# Patient Record
Sex: MALE | Race: WHITE | NOT HISPANIC OR LATINO | URBAN - METROPOLITAN AREA
[De-identification: names, ages, dates, MRNs, and addresses within clinical notes are randomized per-mention and may not be internally consistent; named-entity substitution may affect disease eponyms.]

---

## 2023-12-12 ENCOUNTER — LAB (OUTPATIENT)
Dept: FAMILY MEDICINE CLINIC | Facility: CLINIC | Age: 53
End: 2023-12-12
Payer: COMMERCIAL

## 2023-12-12 ENCOUNTER — OFFICE VISIT (OUTPATIENT)
Dept: FAMILY MEDICINE CLINIC | Facility: CLINIC | Age: 53
End: 2023-12-12
Payer: COMMERCIAL

## 2023-12-12 VITALS
BODY MASS INDEX: 28.58 KG/M2 | RESPIRATION RATE: 16 BRPM | DIASTOLIC BLOOD PRESSURE: 82 MMHG | SYSTOLIC BLOOD PRESSURE: 116 MMHG | HEART RATE: 74 BPM | WEIGHT: 211 LBS | HEIGHT: 72 IN | OXYGEN SATURATION: 97 %

## 2023-12-12 DIAGNOSIS — Z13.220 LIPID SCREENING: ICD-10-CM

## 2023-12-12 DIAGNOSIS — Z13.1 DIABETES MELLITUS SCREENING: ICD-10-CM

## 2023-12-12 DIAGNOSIS — L98.9 SKIN LESION: ICD-10-CM

## 2023-12-12 DIAGNOSIS — Z13.0 SCREENING FOR DEFICIENCY ANEMIA: ICD-10-CM

## 2023-12-12 DIAGNOSIS — Z12.5 PROSTATE CANCER SCREENING: ICD-10-CM

## 2023-12-12 DIAGNOSIS — Z00.00 PREVENTATIVE HEALTH CARE: Primary | ICD-10-CM

## 2023-12-12 DIAGNOSIS — Z12.11 ENCOUNTER FOR SCREENING FOR MALIGNANT NEOPLASM OF COLON: ICD-10-CM

## 2023-12-12 DIAGNOSIS — Z23 NEED FOR PROPHYLACTIC VACCINATION AGAINST DIPHTHERIA AND TETANUS: ICD-10-CM

## 2023-12-12 LAB
BASOPHILS # BLD AUTO: 0.06 10*3/MM3 (ref 0–0.2)
BASOPHILS NFR BLD AUTO: 0.9 % (ref 0–1.5)
CHOLEST SERPL-MCNC: 309 MG/DL (ref 0–200)
DEPRECATED RDW RBC AUTO: 41.4 FL (ref 37–54)
EOSINOPHIL # BLD AUTO: 0.1 10*3/MM3 (ref 0–0.4)
EOSINOPHIL NFR BLD AUTO: 1.5 % (ref 0.3–6.2)
ERYTHROCYTE [DISTWIDTH] IN BLOOD BY AUTOMATED COUNT: 12.2 % (ref 12.3–15.4)
HBA1C MFR BLD: 5.6 % (ref 4.8–5.6)
HCT VFR BLD AUTO: 42.3 % (ref 37.5–51)
HDLC SERPL-MCNC: 45 MG/DL (ref 40–60)
HGB BLD-MCNC: 14.7 G/DL (ref 13–17.7)
IMM GRANULOCYTES # BLD AUTO: 0.01 10*3/MM3 (ref 0–0.05)
IMM GRANULOCYTES NFR BLD AUTO: 0.2 % (ref 0–0.5)
LDLC SERPL CALC-MCNC: 215 MG/DL (ref 0–100)
LDLC/HDLC SERPL: 4.77 {RATIO}
LYMPHOCYTES # BLD AUTO: 2.01 10*3/MM3 (ref 0.7–3.1)
LYMPHOCYTES NFR BLD AUTO: 31 % (ref 19.6–45.3)
MCH RBC QN AUTO: 32.5 PG (ref 26.6–33)
MCHC RBC AUTO-ENTMCNC: 34.8 G/DL (ref 31.5–35.7)
MCV RBC AUTO: 93.6 FL (ref 79–97)
MONOCYTES # BLD AUTO: 0.64 10*3/MM3 (ref 0.1–0.9)
MONOCYTES NFR BLD AUTO: 9.9 % (ref 5–12)
NEUTROPHILS NFR BLD AUTO: 3.67 10*3/MM3 (ref 1.7–7)
NEUTROPHILS NFR BLD AUTO: 56.5 % (ref 42.7–76)
NRBC BLD AUTO-RTO: 0 /100 WBC (ref 0–0.2)
PLATELET # BLD AUTO: 270 10*3/MM3 (ref 140–450)
PMV BLD AUTO: 9.2 FL (ref 6–12)
PSA SERPL-MCNC: 0.89 NG/ML (ref 0–4)
RBC # BLD AUTO: 4.52 10*6/MM3 (ref 4.14–5.8)
TRIGL SERPL-MCNC: 247 MG/DL (ref 0–150)
VLDLC SERPL-MCNC: 49 MG/DL (ref 5–40)
WBC NRBC COR # BLD AUTO: 6.49 10*3/MM3 (ref 3.4–10.8)

## 2023-12-12 PROCEDURE — G0103 PSA SCREENING: HCPCS | Performed by: INTERNAL MEDICINE

## 2023-12-12 PROCEDURE — 83036 HEMOGLOBIN GLYCOSYLATED A1C: CPT | Performed by: INTERNAL MEDICINE

## 2023-12-12 PROCEDURE — 36415 COLL VENOUS BLD VENIPUNCTURE: CPT

## 2023-12-12 PROCEDURE — 85025 COMPLETE CBC W/AUTO DIFF WBC: CPT | Performed by: INTERNAL MEDICINE

## 2023-12-12 PROCEDURE — 80061 LIPID PANEL: CPT | Performed by: INTERNAL MEDICINE

## 2023-12-12 NOTE — PROGRESS NOTES
"Chief Complaint  Establish Care    HPI:    Logan Huitron presents to [facility]    Patient establishing with PCP as girlfriend wanted him to. Patient overall healthy and is not on medication(s) or diagnosed with medical conditions.     Blood pressure overall has been good on home readings. Not on medications.     He does have a couple spots on skin he would like to check out as father recently was seen by dermatology and had to have several spots removed. Denies previously seeing dermatology.     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    History reviewed. No pertinent past medical history.    No current outpatient medications on file.    Social History     Socioeconomic History    Marital status: Single   Tobacco Use    Smoking status: Former     Packs/day: 0.50     Years: 2.00     Additional pack years: 0.00     Total pack years: 1.00     Types: Cigarettes     Quit date: 1991     Years since quittin.1    Smokeless tobacco: Former     Types: Chew     Quit date:    Vaping Use    Vaping Use: Never used   Substance and Sexual Activity    Alcohol use: Yes     Alcohol/week: 6.0 standard drinks of alcohol     Types: 6 Cans of beer per week    Drug use: Never    Sexual activity: Defer        Objective   Vital Signs:  /82   Pulse 74   Resp 16   Ht 182.9 cm (72\")   Wt 95.7 kg (211 lb)   SpO2 97%   BMI 28.62 kg/m²   Estimated body mass index is 28.62 kg/m² as calculated from the following:    Height as of this encounter: 182.9 cm " "(72\").    Weight as of this encounter: 95.7 kg (211 lb).    Physical Exam:  General: Well-appearing patient, no apparent distress  HEENT: No posterior pharynx erythema, no tonsillar erythema or exudates  Neck: No cervical lymphadenopathy  Cardiac: Regular rate and rhythm, normal S1/S2, no murmur, rubs or gallops, no lower extremity edema  Lungs: Clear to auscultation bilaterally, no crackles or wheezes  Abdomen: Soft, non-tender, no guarding or rebound tenderness, no hepatosplenomegaly  Skin: No significant rashes or lesions  MSK: Grossly normal tone and strength  Neuro: Alert and oriented x3, CN II-XII grossly intact  Psych: Appropriate mood and affect    Assessment and Plan:    (Z00.00) Preventative health care  Patient is a 53 year old male who is overall doing well. Reviewed social and family history. Encouraged increased healthy diet and exercise and discussed importance to overall health.  Updating age and gender appropriate cancer screenings including PSA and colonoscopy. Discussed indicated vaccines based on age and comorbidities.  Referring patient to dermatology for routine skin checks.    Plan:  - Encourage healthy diet and exercise  - Up date vaccines, if necessary; Tdap, declines shingles  - Screening labs as ordered  - Update cancer screening as below  - Advanced health care directive     (L98.9) Skin lesion - Plan: Ambulatory Referral to Dermatology    (Z23) Need for prophylactic vaccination against diphtheria and tetanus - Plan: Tdap Vaccine Greater Than or Equal To 8yo IM    (Z13.220) Lipid screening - Plan: Lipid panel    (Z12.5) Prostate cancer screening - Plan: PSA SCREENING    (Z13.0) Screening for deficiency anemia - Plan: CBC w AUTO Differential    (Z13.1) Diabetes mellitus screening - Plan: Hemoglobin A1c    (Z12.11) Encounter for screening for malignant neoplasm of colon - Plan: Ambulatory Referral For Screening Colonoscopy     BMI is >= 25 and <30. (Overweight) The following options were " offered after discussion;: exercise counseling/recommendations and nutrition counseling/recommendations      Patient was given instructions and counseling regarding his condition or for health maintenance advice. Please see specific information pulled into the AVS if appropriate.       Dr Mikey Keller   Internal Medicine Physician  [facility]--[address], [address]  [place] [address]

## 2023-12-12 NOTE — PATIENT INSTRUCTIONS
Stay in the room for tetanus vaccine    Please stop at lab on second floor to have blood drawn    Not currently on medications    Follow up for colonoscopy as scheduled    Follow up with dermatology as scheduled    Encourage healthy diet and exercise    Follow up in one year or sooner if something arises

## 2024-02-14 ENCOUNTER — OFFICE VISIT (OUTPATIENT)
Dept: FAMILY MEDICINE CLINIC | Facility: CLINIC | Age: 54
End: 2024-02-14
Payer: COMMERCIAL

## 2024-02-14 VITALS
HEART RATE: 82 BPM | BODY MASS INDEX: 28.5 KG/M2 | SYSTOLIC BLOOD PRESSURE: 142 MMHG | OXYGEN SATURATION: 95 % | WEIGHT: 210.4 LBS | RESPIRATION RATE: 16 BRPM | DIASTOLIC BLOOD PRESSURE: 92 MMHG | HEIGHT: 72 IN

## 2024-02-14 DIAGNOSIS — H66.90 ACUTE OTITIS MEDIA, UNSPECIFIED OTITIS MEDIA TYPE: Primary | ICD-10-CM

## 2024-02-14 PROCEDURE — 99213 OFFICE O/P EST LOW 20 MIN: CPT | Performed by: INTERNAL MEDICINE

## 2024-02-14 RX ORDER — AMOXICILLIN AND CLAVULANATE POTASSIUM 875; 125 MG/1; MG/1
1 TABLET, FILM COATED ORAL 2 TIMES DAILY
Qty: 14 TABLET | Refills: 0 | Status: SHIPPED | OUTPATIENT
Start: 2024-02-14

## 2024-02-29 ENCOUNTER — ON CAMPUS - OUTPATIENT (OUTPATIENT)
Dept: URBAN - METROPOLITAN AREA HOSPITAL 2 | Facility: HOSPITAL | Age: 54
End: 2024-02-29
Payer: COMMERCIAL

## 2024-02-29 ENCOUNTER — OFFICE (OUTPATIENT)
Dept: URBAN - METROPOLITAN AREA PATHOLOGY 19 | Facility: PATHOLOGY | Age: 54
End: 2024-02-29
Payer: COMMERCIAL

## 2024-02-29 VITALS
RESPIRATION RATE: 17 BRPM | SYSTOLIC BLOOD PRESSURE: 122 MMHG | SYSTOLIC BLOOD PRESSURE: 126 MMHG | HEART RATE: 77 BPM | HEIGHT: 72 IN | DIASTOLIC BLOOD PRESSURE: 77 MMHG | DIASTOLIC BLOOD PRESSURE: 83 MMHG | DIASTOLIC BLOOD PRESSURE: 75 MMHG | DIASTOLIC BLOOD PRESSURE: 97 MMHG | HEART RATE: 72 BPM | HEART RATE: 80 BPM | SYSTOLIC BLOOD PRESSURE: 121 MMHG | SYSTOLIC BLOOD PRESSURE: 112 MMHG | DIASTOLIC BLOOD PRESSURE: 78 MMHG | DIASTOLIC BLOOD PRESSURE: 89 MMHG | OXYGEN SATURATION: 100 % | OXYGEN SATURATION: 98 % | HEART RATE: 74 BPM | SYSTOLIC BLOOD PRESSURE: 113 MMHG | HEART RATE: 70 BPM | HEART RATE: 81 BPM | SYSTOLIC BLOOD PRESSURE: 106 MMHG | TEMPERATURE: 98 F | DIASTOLIC BLOOD PRESSURE: 82 MMHG | WEIGHT: 208 LBS | SYSTOLIC BLOOD PRESSURE: 138 MMHG | DIASTOLIC BLOOD PRESSURE: 76 MMHG | HEART RATE: 82 BPM | SYSTOLIC BLOOD PRESSURE: 115 MMHG | RESPIRATION RATE: 18 BRPM | SYSTOLIC BLOOD PRESSURE: 116 MMHG | OXYGEN SATURATION: 99 % | DIASTOLIC BLOOD PRESSURE: 87 MMHG

## 2024-02-29 DIAGNOSIS — D12.4 BENIGN NEOPLASM OF DESCENDING COLON: ICD-10-CM

## 2024-02-29 DIAGNOSIS — Z12.11 ENCOUNTER FOR SCREENING FOR MALIGNANT NEOPLASM OF COLON: ICD-10-CM

## 2024-02-29 DIAGNOSIS — D12.2 BENIGN NEOPLASM OF ASCENDING COLON: ICD-10-CM

## 2024-02-29 DIAGNOSIS — K63.5 POLYP OF COLON: ICD-10-CM

## 2024-02-29 LAB
GI HISTOLOGY: A. ASCENDING COLON: (no result)
GI HISTOLOGY: B. DESCENDING COLON: (no result)
GI HISTOLOGY: C. SIGMOID COLON: (no result)
GI HISTOLOGY: PDF REPORT: (no result)

## 2024-02-29 PROCEDURE — 88305 TISSUE EXAM BY PATHOLOGIST: CPT | Performed by: PATHOLOGY

## 2024-02-29 PROCEDURE — 45385 COLONOSCOPY W/LESION REMOVAL: CPT | Mod: 33 | Performed by: INTERNAL MEDICINE

## 2024-02-29 RX ADMIN — ONDANSETRON HYDROCHLORIDE 4 MG: 4 SOLUTION ORAL at 09:30

## 2024-06-07 ENCOUNTER — HOSPITAL ENCOUNTER (INPATIENT)
Facility: HOSPITAL | Age: 54
LOS: 2 days | Discharge: HOME | DRG: 321 | End: 2024-06-09
Attending: EMERGENCY MEDICINE | Admitting: INTERNAL MEDICINE
Payer: COMMERCIAL

## 2024-06-07 ENCOUNTER — APPOINTMENT (OUTPATIENT)
Dept: GENERAL RADIOLOGY | Facility: HOSPITAL | Age: 54
DRG: 321 | End: 2024-06-07
Payer: COMMERCIAL

## 2024-06-07 DIAGNOSIS — I24.9 ACUTE CORONARY SYNDROME WITH HIGH TROPONIN: ICD-10-CM

## 2024-06-07 DIAGNOSIS — I21.09: Primary | ICD-10-CM

## 2024-06-07 DIAGNOSIS — K85.20 ALCOHOL-INDUCED ACUTE PANCREATITIS, UNSPECIFIED COMPLICATION STATUS: ICD-10-CM

## 2024-06-07 DIAGNOSIS — E78.00 HYPERCHOLESTEROLEMIA: ICD-10-CM

## 2024-06-07 DIAGNOSIS — I24.9 ACS (ACUTE CORONARY SYNDROME): ICD-10-CM

## 2024-06-07 DIAGNOSIS — F10.10 ALCOHOL ABUSE: ICD-10-CM

## 2024-06-07 LAB
ALBUMIN SERPL-MCNC: 4.2 G/DL (ref 3.5–5.2)
ALBUMIN/GLOB SERPL: 2.3 G/DL
ALP SERPL-CCNC: 101 U/L (ref 39–117)
ALT SERPL W P-5'-P-CCNC: 32 U/L (ref 1–41)
ANION GAP SERPL CALCULATED.3IONS-SCNC: 10.9 MMOL/L (ref 5–15)
APTT PPP: 26.5 SECONDS (ref 61–76.5)
APTT PPP: 34.8 SECONDS (ref 61–76.5)
AST SERPL-CCNC: 80 U/L (ref 1–40)
BASOPHILS # BLD AUTO: 0.04 10*3/MM3 (ref 0–0.2)
BASOPHILS # BLD AUTO: 0.04 10*3/MM3 (ref 0–0.2)
BASOPHILS NFR BLD AUTO: 0.4 % (ref 0–1.5)
BASOPHILS NFR BLD AUTO: 0.4 % (ref 0–1.5)
BILIRUB SERPL-MCNC: 0.2 MG/DL (ref 0–1.2)
BUN SERPL-MCNC: 19 MG/DL (ref 6–20)
BUN/CREAT SERPL: 18.6 (ref 7–25)
CALCIUM SPEC-SCNC: 9.5 MG/DL (ref 8.6–10.5)
CHLORIDE SERPL-SCNC: 101 MMOL/L (ref 98–107)
CHOLEST SERPL-MCNC: 235 MG/DL (ref 0–200)
CO2 SERPL-SCNC: 24.1 MMOL/L (ref 22–29)
CREAT SERPL-MCNC: 1.02 MG/DL (ref 0.76–1.27)
DEPRECATED RDW RBC AUTO: 44.3 FL (ref 37–54)
DEPRECATED RDW RBC AUTO: 44.4 FL (ref 37–54)
EGFRCR SERPLBLD CKD-EPI 2021: 87.3 ML/MIN/1.73
EOSINOPHIL # BLD AUTO: 0.04 10*3/MM3 (ref 0–0.4)
EOSINOPHIL # BLD AUTO: 0.09 10*3/MM3 (ref 0–0.4)
EOSINOPHIL NFR BLD AUTO: 0.4 % (ref 0.3–6.2)
EOSINOPHIL NFR BLD AUTO: 0.9 % (ref 0.3–6.2)
ERYTHROCYTE [DISTWIDTH] IN BLOOD BY AUTOMATED COUNT: 12.3 % (ref 12.3–15.4)
ERYTHROCYTE [DISTWIDTH] IN BLOOD BY AUTOMATED COUNT: 12.4 % (ref 12.3–15.4)
GEN 5 2HR TROPONIN T REFLEX: 2156 NG/L
GLOBULIN UR ELPH-MCNC: 1.8 GM/DL
GLUCOSE SERPL-MCNC: 96 MG/DL (ref 65–99)
HCT VFR BLD AUTO: 39.1 % (ref 37.5–51)
HCT VFR BLD AUTO: 42.9 % (ref 37.5–51)
HDLC SERPL-MCNC: 35 MG/DL (ref 40–60)
HGB BLD-MCNC: 13 G/DL (ref 13–17.7)
HGB BLD-MCNC: 14.1 G/DL (ref 13–17.7)
HOLD SPECIMEN: NORMAL
HOLD SPECIMEN: NORMAL
IMM GRANULOCYTES # BLD AUTO: 0.01 10*3/MM3 (ref 0–0.05)
IMM GRANULOCYTES # BLD AUTO: 0.03 10*3/MM3 (ref 0–0.05)
IMM GRANULOCYTES NFR BLD AUTO: 0.1 % (ref 0–0.5)
IMM GRANULOCYTES NFR BLD AUTO: 0.3 % (ref 0–0.5)
INR PPP: 0.96 (ref 0.93–1.1)
INR PPP: 0.97 (ref 0.93–1.1)
INR PPP: 1 (ref 0.8–1.2)
LDLC SERPL CALC-MCNC: 133 MG/DL (ref 0–100)
LDLC/HDLC SERPL: 3.61 {RATIO}
LIPASE SERPL-CCNC: 62 U/L (ref 13–60)
LYMPHOCYTES # BLD AUTO: 1.81 10*3/MM3 (ref 0.7–3.1)
LYMPHOCYTES # BLD AUTO: 1.97 10*3/MM3 (ref 0.7–3.1)
LYMPHOCYTES NFR BLD AUTO: 17.2 % (ref 19.6–45.3)
LYMPHOCYTES NFR BLD AUTO: 19.1 % (ref 19.6–45.3)
MAGNESIUM SERPL-MCNC: 2.1 MG/DL (ref 1.6–2.6)
MCH RBC QN AUTO: 32 PG (ref 26.6–33)
MCH RBC QN AUTO: 32.2 PG (ref 26.6–33)
MCHC RBC AUTO-ENTMCNC: 32.9 G/DL (ref 31.5–35.7)
MCHC RBC AUTO-ENTMCNC: 33.2 G/DL (ref 31.5–35.7)
MCV RBC AUTO: 96.8 FL (ref 79–97)
MCV RBC AUTO: 97.5 FL (ref 79–97)
MONOCYTES # BLD AUTO: 1 10*3/MM3 (ref 0.1–0.9)
MONOCYTES # BLD AUTO: 1.06 10*3/MM3 (ref 0.1–0.9)
MONOCYTES NFR BLD AUTO: 10.1 % (ref 5–12)
MONOCYTES NFR BLD AUTO: 9.7 % (ref 5–12)
NEUTROPHILS NFR BLD AUTO: 69.8 % (ref 42.7–76)
NEUTROPHILS NFR BLD AUTO: 7.18 10*3/MM3 (ref 1.7–7)
NEUTROPHILS NFR BLD AUTO: 7.52 10*3/MM3 (ref 1.7–7)
NEUTROPHILS NFR BLD AUTO: 71.6 % (ref 42.7–76)
NRBC BLD AUTO-RTO: 0 /100 WBC (ref 0–0.2)
NT-PROBNP SERPL-MCNC: 665.7 PG/ML (ref 0–900)
PLATELET # BLD AUTO: 186 10*3/MM3 (ref 140–450)
PLATELET # BLD AUTO: 275 10*3/MM3 (ref 140–450)
PMV BLD AUTO: 10.1 FL (ref 6–12)
PMV BLD AUTO: 9 FL (ref 6–12)
POTASSIUM SERPL-SCNC: 3.7 MMOL/L (ref 3.5–5.2)
PROT SERPL-MCNC: 6 G/DL (ref 6–8.5)
PROTHROMBIN TIME: 10.5 SECONDS (ref 9.6–11.7)
PROTHROMBIN TIME: 10.6 SECONDS (ref 9.6–11.7)
PROTHROMBIN TIME: 11 SECONDS
QT INTERVAL: 332 MS
QTC INTERVAL: 395 MS
RBC # BLD AUTO: 4.04 10*6/MM3 (ref 4.14–5.8)
RBC # BLD AUTO: 4.4 10*6/MM3 (ref 4.14–5.8)
SODIUM SERPL-SCNC: 136 MMOL/L (ref 136–145)
TRIGL SERPL-MCNC: 369 MG/DL (ref 0–150)
TROPONIN T DELTA: 1494 NG/L
TROPONIN T SERPL HS-MCNC: 2516 NG/L
TROPONIN T SERPL HS-MCNC: 662 NG/L
TSH SERPL DL<=0.05 MIU/L-ACNC: 3.23 UIU/ML (ref 0.27–4.2)
VLDLC SERPL-MCNC: 67 MG/DL (ref 5–40)
WBC NRBC COR # BLD AUTO: 10.29 10*3/MM3 (ref 3.4–10.8)
WBC NRBC COR # BLD AUTO: 10.5 10*3/MM3 (ref 3.4–10.8)
WHOLE BLOOD HOLD COAG: NORMAL
WHOLE BLOOD HOLD SPECIMEN: NORMAL

## 2024-06-07 PROCEDURE — 71046 X-RAY EXAM CHEST 2 VIEWS: CPT

## 2024-06-07 PROCEDURE — 84484 ASSAY OF TROPONIN QUANT: CPT

## 2024-06-07 PROCEDURE — 83735 ASSAY OF MAGNESIUM: CPT | Performed by: INTERNAL MEDICINE

## 2024-06-07 PROCEDURE — 80050 GENERAL HEALTH PANEL: CPT | Performed by: EMERGENCY MEDICINE

## 2024-06-07 PROCEDURE — 85730 THROMBOPLASTIN TIME PARTIAL: CPT | Performed by: EMERGENCY MEDICINE

## 2024-06-07 PROCEDURE — 25810000003 SODIUM CHLORIDE 0.9 % SOLUTION: Performed by: EMERGENCY MEDICINE

## 2024-06-07 PROCEDURE — 85610 PROTHROMBIN TIME: CPT | Performed by: INTERNAL MEDICINE

## 2024-06-07 PROCEDURE — 25810000003 SODIUM CHLORIDE 0.9 % SOLUTION: Performed by: STUDENT IN AN ORGANIZED HEALTH CARE EDUCATION/TRAINING PROGRAM

## 2024-06-07 PROCEDURE — 93005 ELECTROCARDIOGRAM TRACING: CPT | Performed by: EMERGENCY MEDICINE

## 2024-06-07 PROCEDURE — 84484 ASSAY OF TROPONIN QUANT: CPT | Performed by: STUDENT IN AN ORGANIZED HEALTH CARE EDUCATION/TRAINING PROGRAM

## 2024-06-07 PROCEDURE — 99291 CRITICAL CARE FIRST HOUR: CPT

## 2024-06-07 PROCEDURE — 80061 LIPID PANEL: CPT | Performed by: STUDENT IN AN ORGANIZED HEALTH CARE EDUCATION/TRAINING PROGRAM

## 2024-06-07 PROCEDURE — 83690 ASSAY OF LIPASE: CPT | Performed by: EMERGENCY MEDICINE

## 2024-06-07 PROCEDURE — 85025 COMPLETE CBC W/AUTO DIFF WBC: CPT | Performed by: INTERNAL MEDICINE

## 2024-06-07 PROCEDURE — 36415 COLL VENOUS BLD VENIPUNCTURE: CPT

## 2024-06-07 PROCEDURE — 25010000002 HEPARIN (PORCINE) 25000-0.45 UT/250ML-% SOLUTION: Performed by: EMERGENCY MEDICINE

## 2024-06-07 PROCEDURE — 93005 ELECTROCARDIOGRAM TRACING: CPT | Performed by: STUDENT IN AN ORGANIZED HEALTH CARE EDUCATION/TRAINING PROGRAM

## 2024-06-07 PROCEDURE — 85610 PROTHROMBIN TIME: CPT | Performed by: STUDENT IN AN ORGANIZED HEALTH CARE EDUCATION/TRAINING PROGRAM

## 2024-06-07 PROCEDURE — 85730 THROMBOPLASTIN TIME PARTIAL: CPT | Performed by: STUDENT IN AN ORGANIZED HEALTH CARE EDUCATION/TRAINING PROGRAM

## 2024-06-07 PROCEDURE — 83036 HEMOGLOBIN GLYCOSYLATED A1C: CPT | Performed by: STUDENT IN AN ORGANIZED HEALTH CARE EDUCATION/TRAINING PROGRAM

## 2024-06-07 PROCEDURE — 83880 ASSAY OF NATRIURETIC PEPTIDE: CPT | Performed by: STUDENT IN AN ORGANIZED HEALTH CARE EDUCATION/TRAINING PROGRAM

## 2024-06-07 RX ORDER — NITROGLYCERIN 0.4 MG/1
0.4 TABLET SUBLINGUAL
Status: DISCONTINUED | OUTPATIENT
Start: 2024-06-07 | End: 2024-06-08

## 2024-06-07 RX ORDER — ASPIRIN 325 MG
325 TABLET ORAL ONCE
Status: COMPLETED | OUTPATIENT
Start: 2024-06-07 | End: 2024-06-07

## 2024-06-07 RX ORDER — SODIUM CHLORIDE 9 MG/ML
75 INJECTION, SOLUTION INTRAVENOUS CONTINUOUS
Status: DISCONTINUED | OUTPATIENT
Start: 2024-06-07 | End: 2024-06-08

## 2024-06-07 RX ORDER — SODIUM CHLORIDE 9 MG/ML
40 INJECTION, SOLUTION INTRAVENOUS AS NEEDED
Status: DISCONTINUED | OUTPATIENT
Start: 2024-06-07 | End: 2024-06-08

## 2024-06-07 RX ORDER — HEPARIN SODIUM 10000 [USP'U]/100ML
10.8 INJECTION, SOLUTION INTRAVENOUS
Status: DISCONTINUED | OUTPATIENT
Start: 2024-06-07 | End: 2024-06-08

## 2024-06-07 RX ORDER — SODIUM CHLORIDE 9 MG/ML
40 INJECTION, SOLUTION INTRAVENOUS AS NEEDED
Status: DISCONTINUED | OUTPATIENT
Start: 2024-06-07 | End: 2024-06-09 | Stop reason: HOSPADM

## 2024-06-07 RX ORDER — ONDANSETRON 4 MG/1
4 TABLET, ORALLY DISINTEGRATING ORAL EVERY 6 HOURS PRN
Status: DISCONTINUED | OUTPATIENT
Start: 2024-06-07 | End: 2024-06-08

## 2024-06-07 RX ORDER — SODIUM CHLORIDE 0.9 % (FLUSH) 0.9 %
10 SYRINGE (ML) INJECTION AS NEEDED
Status: DISCONTINUED | OUTPATIENT
Start: 2024-06-07 | End: 2024-06-09 | Stop reason: HOSPADM

## 2024-06-07 RX ORDER — ONDANSETRON 2 MG/ML
4 INJECTION INTRAMUSCULAR; INTRAVENOUS EVERY 6 HOURS PRN
Status: DISCONTINUED | OUTPATIENT
Start: 2024-06-07 | End: 2024-06-08

## 2024-06-07 RX ORDER — SODIUM CHLORIDE 0.9 % (FLUSH) 0.9 %
10 SYRINGE (ML) INJECTION EVERY 12 HOURS SCHEDULED
Status: DISCONTINUED | OUTPATIENT
Start: 2024-06-07 | End: 2024-06-09 | Stop reason: HOSPADM

## 2024-06-07 RX ORDER — ASPIRIN 81 MG/1
81 TABLET ORAL DAILY
Status: DISCONTINUED | OUTPATIENT
Start: 2024-06-08 | End: 2024-06-09 | Stop reason: HOSPADM

## 2024-06-07 RX ORDER — SODIUM CHLORIDE 0.9 % (FLUSH) 0.9 %
3 SYRINGE (ML) INJECTION EVERY 12 HOURS SCHEDULED
Status: DISCONTINUED | OUTPATIENT
Start: 2024-06-07 | End: 2024-06-08

## 2024-06-07 RX ORDER — HEPARIN SODIUM 10000 [USP'U]/100ML
10.8 INJECTION, SOLUTION INTRAVENOUS
Status: DISCONTINUED | OUTPATIENT
Start: 2024-06-07 | End: 2024-06-07

## 2024-06-07 RX ORDER — HEPARIN SODIUM 1000 [USP'U]/ML
4000 INJECTION, SOLUTION INTRAVENOUS; SUBCUTANEOUS ONCE
Status: DISCONTINUED | OUTPATIENT
Start: 2024-06-07 | End: 2024-06-07

## 2024-06-07 RX ORDER — SODIUM CHLORIDE 0.9 % (FLUSH) 0.9 %
3-10 SYRINGE (ML) INJECTION AS NEEDED
Status: DISCONTINUED | OUTPATIENT
Start: 2024-06-07 | End: 2024-06-08

## 2024-06-07 RX ORDER — ATORVASTATIN CALCIUM 40 MG/1
80 TABLET, FILM COATED ORAL NIGHTLY
Status: DISCONTINUED | OUTPATIENT
Start: 2024-06-07 | End: 2024-06-09 | Stop reason: HOSPADM

## 2024-06-07 RX ADMIN — HEPARIN SODIUM 10.8 UNITS/KG/HR: 10000 INJECTION, SOLUTION INTRAVENOUS at 16:54

## 2024-06-07 RX ADMIN — ATORVASTATIN CALCIUM 80 MG: 40 TABLET, FILM COATED ORAL at 21:27

## 2024-06-07 RX ADMIN — SODIUM CHLORIDE 75 ML/HR: 900 INJECTION, SOLUTION INTRAVENOUS at 20:59

## 2024-06-07 RX ADMIN — NITROGLYCERIN 0.4 MG: 0.4 TABLET SUBLINGUAL at 16:14

## 2024-06-07 RX ADMIN — Medication 10 ML: at 21:27

## 2024-06-07 RX ADMIN — NITROGLYCERIN 0.4 MG: 0.4 TABLET SUBLINGUAL at 16:04

## 2024-06-07 RX ADMIN — ASPIRIN 325 MG ORAL TABLET 325 MG: 325 PILL ORAL at 15:57

## 2024-06-07 RX ADMIN — SODIUM CHLORIDE 1000 ML: 9 INJECTION, SOLUTION INTRAVENOUS at 16:34

## 2024-06-07 RX ADMIN — Medication 10 ML: at 20:59

## 2024-06-07 NOTE — ED NOTES
Report given to Mariya with EMS.  Did explain that MD's are aware of patients troponin, hep gtt infusing.  Pt left with belongings and paperwork, EMTALA as well.

## 2024-06-07 NOTE — Clinical Note
A 5 fr sheath was successfully inserted into the right femoral artery.
ACT = 250 (sec). ACT was drawn at 10:42 EDT. ACT result was completed at 10:48 EDT.
ACT = 256 (sec). ACT was drawn at 10:30 EDT. ACT result was completed at 10:35 EDT.
All interventional equipment removed.
Allergies reviewed.  H&P note has been confirmed for the patient. Procedural consent has been signed.  Staff has reviewed the patient's labs.
Balloon inserted in left anterior descending.
Balloon inserted in left anterior descending.
Catheter Pulled back from LV to AO. Measurement captured.
Catheter inserted with wire simultaneously.
Dr. James reviewing films 
Dr. Solorzano arrived for intervention 
First balloon inflation max pressure = 12 crystal. First balloon inflation duration = 6 seconds. Second inflation of balloon - Max pressure = 12 crystal. 2nd Inflation of balloon - Duration = 5 seconds.
First balloon inflation max pressure = 12 crystal. First balloon inflation duration = 6 seconds. Second inflation of balloon - Max pressure = 12 crystal. 2nd Inflation of balloon - Duration = 6 seconds.
First balloon inflation max pressure = 14 crystal. First balloon inflation duration = 4 seconds.
Groin soft no hematoma.  Pt sitting at 30 degrees.
Hemostasis started on the right femoral artery. Angio-Seal was used in achieving hemostasis. Closure device deployed in the vessel. Hemostasis achieved successfully.
IVUS Procedure End
IVUS catheter advanced 
IVUS catheter removed, images being reviewed 
Interventional Guidewire removed without incident
Intravascular ultrasound catheter inserted for high resolution imaging
Left anterior descending lesion.
Left anterior descending stent inserted.
No in lab complications
Patient to be held and monitored in cath lab while room is being cleaned 
Patient was given Post Procedure instruction by the staff.
Physician notified by staff.
Prepped: right groin. Prepped with: ChloraPrep. The site was clipped. The patient was draped in a sterile fashion.
Removed intact
Removed intact
Replaced previous sheath in the right femoral artery.
Report was  verbally given at bedside. .
Right femoral groin site clean, dry and intact.  Right pulses are Pt 2+ and DP doppler.  Leg is warm.  Pt resting comfortably.
Right groin soft no hematoma. Pulses 2+
Rt. Femoral angiogram 
Stent balloon removed intact.
The left DP pulse is +2. The right DP pulse is detected w/ doppler. The PT pulses are +2 bilaterally.
The left coronary artery was selectively engaged, injected and visualized.
The left ventricle was injected and visualized. Rate = 12 mL/sec. Total volume = 36 mL.
The physician has confirmed that the patient has been reassessed and is appropriate for moderate sedation
The right coronary artery was selectively engaged, injected and visualized.
Unchanged
Wire inserted in left anterior descending.
catheter inserted over wire.
catheter removed.
guide catheter removed .
stated

## 2024-06-07 NOTE — FSED PROVIDER NOTE
Subjective   History of Present Illness  53-year-old male presents to the emergency department with chief complaint of chest pain.  Patient states his current dull chest pain for the past 4 hours.  Constant.  No radiation.  4 out of 10.  Patient was playing golf and states he noticed worsening of the pain on his back swing.  First episode of the pain was 2 days ago when it lasted for 30 minutes.  He had several episodes that day.  Today's episode is constant without any relief of the pain.  He denies any shortness of breath, nausea, vomiting, diaphoresis, radiation of the pain.  Patient has a history of hypercholesterolemia which is untreated.  Patient also has history of daily drinking up to a sixpack of beer        Review of Systems   Constitutional: Negative.    HENT: Negative.     Respiratory: Negative.     Cardiovascular:  Positive for chest pain.   Gastrointestinal: Negative.    All other systems reviewed and are negative.      History reviewed. No pertinent past medical history.    No Known Allergies    Past Surgical History:   Procedure Laterality Date    HAND SURGERY      TONSILLECTOMY         Family History   Problem Relation Age of Onset    No Known Problems Mother     No Known Problems Father        Social History     Socioeconomic History    Marital status: Single   Tobacco Use    Smoking status: Former     Current packs/day: 0.00     Average packs/day: 0.5 packs/day for 2.0 years (1.0 ttl pk-yrs)     Types: Cigarettes     Start date: 1989     Quit date: 1991     Years since quittin.6    Smokeless tobacco: Former     Types: Chew     Quit date:    Vaping Use    Vaping status: Never Used   Substance and Sexual Activity    Alcohol use: Yes     Alcohol/week: 6.0 standard drinks of alcohol     Types: 6 Cans of beer per week    Drug use: Never    Sexual activity: Defer           Objective   Physical Exam  Vitals and nursing note reviewed.   Constitutional:       Appearance: He is  well-developed.   HENT:      Head: Normocephalic and atraumatic.   Eyes:      Extraocular Movements: Extraocular movements intact.      Pupils: Pupils are equal, round, and reactive to light.   Cardiovascular:      Heart sounds: Normal heart sounds.   Pulmonary:      Effort: Pulmonary effort is normal.      Breath sounds: Normal breath sounds.   Chest:      Chest wall: No mass, tenderness or edema.   Abdominal:      General: Bowel sounds are normal.      Palpations: Abdomen is soft.   Musculoskeletal:         General: Normal range of motion.      Cervical back: Normal range of motion and neck supple.   Skin:     General: Skin is warm and dry.   Neurological:      General: No focal deficit present.      Mental Status: He is alert.   Psychiatric:         Mood and Affect: Mood normal.         Behavior: Behavior normal.         ECG 12 Lead      Date/Time: 6/7/2024 4:02 PM    Performed by: Juan Chavarria MD  Authorized by: Juan Chavarria MD  Previous ECG: no previous ECG available  Rhythm: sinus rhythm  Rate: normal  QRS axis: normal  ST Segments: ST segments normal  T Waves: T waves normal  Q waves: V1, V2 and V3  Clinical impression: abnormal ECG    ECG 12 Lead      Date/Time: 6/7/2024 4:34 PM    Performed by: Juan Chavarria MD  Authorized by: Juan Chavarria MD  Rhythm: sinus rhythm  Rate: bradycardic  Clinical impression: abnormal ECG    Critical Care    Performed by: Juan Chavarria MD  Authorized by: Juan Chavarria MD    Critical care provider statement:     Critical care time (minutes):  70    Critical care time was exclusive of:  Separately billable procedures and treating other patients    Critical care was necessary to treat or prevent imminent or life-threatening deterioration of the following conditions:  Circulatory failure    Critical care was time spent personally by me on the following activities:  Ordering and review of laboratory studies, ordering and review of radiographic  studies, pulse oximetry, re-evaluation of patient's condition, obtaining history from patient or surrogate, discussions with primary provider and discussions with consultants    I assumed direction of critical care for this patient from another provider in my specialty: yes      Care discussed with: admitting provider and accepting provider at another facility               ED Course  ED Course as of 06/07/24 1657   Fri Jun 07, 2024   1638 Lipase(!): 62 [RW]      ED Course User Index  [RW] Juan Chavarria MD      No change in pain with initial nitro                                     Medical Decision Making  Amount and/or Complexity of Data Reviewed  Independent Historian: spouse  Labs: ordered.  Radiology: ordered.  ECG/medicine tests: ordered and independent interpretation performed. Decision-making details documented in ED Course.  Discussion of management or test interpretation with external provider(s): Internal medicine and cardiology    Risk  OTC drugs.  Prescription drug management.  Decision regarding hospitalization.        Final diagnoses:   Acute transmural myocardial infarct anterior wall, initial hospitaliz   Acute coronary syndrome with high troponin   Alcohol-induced acute pancreatitis, unspecified complication status   Alcohol abuse   Hypercholesterolemia       ED Disposition  ED Disposition       ED Disposition   Decision to Admit    Condition   --    Comment   Level of Care: Progressive Care [20]   Diagnosis: ACS (acute coronary syndrome) [470645]   Admitting Physician: EDY ALDRIDGE [[id number]]   Attending Physician: EDY ALDRIDGE [[id number]]   Certification: I Certify That Inpatient Hospital Services Are Medically Necessary For Greater Than 2 Midnights                 No follow-up provider specified.       Medication List      No changes were made to your prescriptions during this visit.

## 2024-06-07 NOTE — ED NOTES
Pt called out on call light and stated he was feeling lightheaded. BP and HR dropped. MD informed. Bolus NS started. O2 2L placed and EKG done.

## 2024-06-07 NOTE — ED NOTES
Patient resting in room, States he is currently in no pain and actually feels better.  Pt second troponin higher than the first.  Per day shift RN, pt to remain on heparin gtt and will be seen at hospital, providers are aware. Pts family at bedside.  Waiting on transport.  Verified heparin dose to order.  Will continue to monitor.

## 2024-06-07 NOTE — ED NOTES
Pt complains of midsternal chest pain since Wednesday. Came in today because it wasn't getting any better. Denies any radiating pain or shortness of breath.

## 2024-06-07 NOTE — ED NOTES
Spoke with Dr. James regarding increase in Toponin. Dr. James states that he will be seen when transferred to [facility] and that noting about the plan of care will change at this time.

## 2024-06-08 ENCOUNTER — APPOINTMENT (OUTPATIENT)
Dept: CARDIOLOGY | Facility: HOSPITAL | Age: 54
DRG: 321 | End: 2024-06-08
Payer: COMMERCIAL

## 2024-06-08 PROBLEM — I21.09: Status: ACTIVE | Noted: 2024-06-07

## 2024-06-08 PROBLEM — K85.20 ALCOHOL-INDUCED ACUTE PANCREATITIS: Status: ACTIVE | Noted: 2024-06-07

## 2024-06-08 PROBLEM — I24.9 ACUTE CORONARY SYNDROME WITH HIGH TROPONIN: Status: ACTIVE | Noted: 2024-06-07

## 2024-06-08 PROBLEM — E78.00 HYPERCHOLESTEROLEMIA: Status: ACTIVE | Noted: 2024-06-07

## 2024-06-08 PROBLEM — F10.10 ALCOHOL ABUSE: Status: ACTIVE | Noted: 2024-06-07

## 2024-06-08 LAB
ACT BLD: 250 SECONDS (ref 89–137)
ACT BLD: 256 SECONDS (ref 89–137)
ANION GAP SERPL CALCULATED.3IONS-SCNC: 8.9 MMOL/L (ref 5–15)
AORTIC DIMENSIONLESS INDEX: 0.97 (DI)
APTT PPP: 94.2 SECONDS (ref 61–76.5)
BH CV ECHO LEFT VENTRICLE GLOBAL LONGITUDINAL STRAIN: -9.4 %
BH CV ECHO MEAS - ACS: 2 CM
BH CV ECHO MEAS - AO MAX PG: 3.5 MMHG
BH CV ECHO MEAS - AO MEAN PG: 2 MMHG
BH CV ECHO MEAS - AO V2 MAX: 93.8 CM/SEC
BH CV ECHO MEAS - AO V2 VTI: 18.6 CM
BH CV ECHO MEAS - AVA(I,D): 2.39 CM2
BH CV ECHO MEAS - EDV(CUBED): 125 ML
BH CV ECHO MEAS - EDV(MOD-SP4): 70.5 ML
BH CV ECHO MEAS - EF(MOD-SP4): 41.8 %
BH CV ECHO MEAS - ESV(CUBED): 39.3 ML
BH CV ECHO MEAS - ESV(MOD-SP4): 41 ML
BH CV ECHO MEAS - FS: 32 %
BH CV ECHO MEAS - IVS/LVPW: 1.09 CM
BH CV ECHO MEAS - IVSD: 1.2 CM
BH CV ECHO MEAS - LA DIMENSION: 3.1 CM
BH CV ECHO MEAS - LAT PEAK E' VEL: 7.9 CM/SEC
BH CV ECHO MEAS - LV MASS(C)D: 220.3 GRAMS
BH CV ECHO MEAS - LV MAX PG: 3.3 MMHG
BH CV ECHO MEAS - LV MEAN PG: 2 MMHG
BH CV ECHO MEAS - LV V1 MAX: 91 CM/SEC
BH CV ECHO MEAS - LV V1 VTI: 17.5 CM
BH CV ECHO MEAS - LVIDD: 5 CM
BH CV ECHO MEAS - LVIDS: 3.4 CM
BH CV ECHO MEAS - LVOT AREA: 2.5 CM2
BH CV ECHO MEAS - LVOT DIAM: 1.8 CM
BH CV ECHO MEAS - LVPWD: 1.1 CM
BH CV ECHO MEAS - MED PEAK E' VEL: 7.2 CM/SEC
BH CV ECHO MEAS - MR MAX PG: 23.8 MMHG
BH CV ECHO MEAS - MR MAX VEL: 244 CM/SEC
BH CV ECHO MEAS - MV A MAX VEL: 92.2 CM/SEC
BH CV ECHO MEAS - MV DEC SLOPE: 514.5 CM/SEC2
BH CV ECHO MEAS - MV DEC TIME: 0.16 SEC
BH CV ECHO MEAS - MV E MAX VEL: 103 CM/SEC
BH CV ECHO MEAS - MV E/A: 1.12
BH CV ECHO MEAS - MV MAX PG: 3 MMHG
BH CV ECHO MEAS - MV MEAN PG: 2 MMHG
BH CV ECHO MEAS - MV P1/2T: 56.2 MSEC
BH CV ECHO MEAS - MV V2 VTI: 21.3 CM
BH CV ECHO MEAS - MVA(P1/2T): 3.9 CM2
BH CV ECHO MEAS - MVA(VTI): 2.09 CM2
BH CV ECHO MEAS - PA V2 MAX: 74.4 CM/SEC
BH CV ECHO MEAS - QP/QS: 0.74
BH CV ECHO MEAS - RV MAX PG: 1.25 MMHG
BH CV ECHO MEAS - RV V1 MAX: 55.9 CM/SEC
BH CV ECHO MEAS - RV V1 VTI: 10.5 CM
BH CV ECHO MEAS - RVDD: 2.4 CM
BH CV ECHO MEAS - RVOT DIAM: 2 CM
BH CV ECHO MEAS - SV(LVOT): 44.5 ML
BH CV ECHO MEAS - SV(MOD-SP4): 29.5 ML
BH CV ECHO MEAS - SV(RVOT): 33 ML
BH CV ECHO MEAS - TAPSE (>1.6): 2.7 CM
BH CV ECHO MEAS - TR MAX PG: 19.4 MMHG
BH CV ECHO MEAS - TR MAX VEL: 220 CM/SEC
BH CV ECHO MEAS RV FREE WALL STRAIN: -11.6 %
BH CV ECHO MEASUREMENTS AVERAGE E/E' RATIO: 13.64
BH CV XLRA - TDI S': 12.3 CM/SEC
BUN SERPL-MCNC: 12 MG/DL (ref 6–20)
BUN/CREAT SERPL: 14 (ref 7–25)
CALCIUM SPEC-SCNC: 8.7 MG/DL (ref 8.6–10.5)
CHLORIDE SERPL-SCNC: 105 MMOL/L (ref 98–107)
CO2 SERPL-SCNC: 23.1 MMOL/L (ref 22–29)
CREAT SERPL-MCNC: 0.86 MG/DL (ref 0.76–1.27)
EGFRCR SERPLBLD CKD-EPI 2021: 102.9 ML/MIN/1.73
GLUCOSE SERPL-MCNC: 113 MG/DL (ref 65–99)
HBA1C MFR BLD: 5.42 % (ref 4.8–5.6)
POTASSIUM SERPL-SCNC: 3.9 MMOL/L (ref 3.5–5.2)
QT INTERVAL: 348 MS
QTC INTERVAL: 411 MS
SINUS: 2.8 CM
SODIUM SERPL-SCNC: 137 MMOL/L (ref 136–145)
STJ: 2.3 CM

## 2024-06-08 PROCEDURE — 25010000002 NITROGLYCERIN 5 MG/ML SOLUTION: Performed by: INTERNAL MEDICINE

## 2024-06-08 PROCEDURE — B240ZZ3 ULTRASONOGRAPHY OF SINGLE CORONARY ARTERY, INTRAVASCULAR: ICD-10-PCS | Performed by: INTERNAL MEDICINE

## 2024-06-08 PROCEDURE — 93005 ELECTROCARDIOGRAM TRACING: CPT | Performed by: INTERNAL MEDICINE

## 2024-06-08 PROCEDURE — 92928 PRQ TCAT PLMT NTRAC ST 1 LES: CPT | Performed by: INTERNAL MEDICINE

## 2024-06-08 PROCEDURE — 93458 L HRT ARTERY/VENTRICLE ANGIO: CPT | Performed by: INTERNAL MEDICINE

## 2024-06-08 PROCEDURE — 4A023N7 MEASUREMENT OF CARDIAC SAMPLING AND PRESSURE, LEFT HEART, PERCUTANEOUS APPROACH: ICD-10-PCS | Performed by: INTERNAL MEDICINE

## 2024-06-08 PROCEDURE — 25010000002 FENTANYL CITRATE (PF) 100 MCG/2ML SOLUTION: Performed by: INTERNAL MEDICINE

## 2024-06-08 PROCEDURE — 99152 MOD SED SAME PHYS/QHP 5/>YRS: CPT | Performed by: INTERNAL MEDICINE

## 2024-06-08 PROCEDURE — B2151ZZ FLUOROSCOPY OF LEFT HEART USING LOW OSMOLAR CONTRAST: ICD-10-PCS | Performed by: INTERNAL MEDICINE

## 2024-06-08 PROCEDURE — 25810000003 SODIUM CHLORIDE 0.9 % SOLUTION: Performed by: STUDENT IN AN ORGANIZED HEALTH CARE EDUCATION/TRAINING PROGRAM

## 2024-06-08 PROCEDURE — 93356 MYOCRD STRAIN IMG SPCKL TRCK: CPT

## 2024-06-08 PROCEDURE — C1894 INTRO/SHEATH, NON-LASER: HCPCS | Performed by: INTERNAL MEDICINE

## 2024-06-08 PROCEDURE — 99223 1ST HOSP IP/OBS HIGH 75: CPT | Performed by: INTERNAL MEDICINE

## 2024-06-08 PROCEDURE — 99153 MOD SED SAME PHYS/QHP EA: CPT | Performed by: INTERNAL MEDICINE

## 2024-06-08 PROCEDURE — 93010 ELECTROCARDIOGRAM REPORT: CPT | Performed by: INTERNAL MEDICINE

## 2024-06-08 PROCEDURE — 25010000002 ATROPINE SULFATE: Performed by: STUDENT IN AN ORGANIZED HEALTH CARE EDUCATION/TRAINING PROGRAM

## 2024-06-08 PROCEDURE — C1769 GUIDE WIRE: HCPCS | Performed by: INTERNAL MEDICINE

## 2024-06-08 PROCEDURE — C1874 STENT, COATED/COV W/DEL SYS: HCPCS | Performed by: INTERNAL MEDICINE

## 2024-06-08 PROCEDURE — C1725 CATH, TRANSLUMIN NON-LASER: HCPCS | Performed by: INTERNAL MEDICINE

## 2024-06-08 PROCEDURE — 25010000002 HEPARIN (PORCINE) PER 1000 UNITS: Performed by: INTERNAL MEDICINE

## 2024-06-08 PROCEDURE — 92978 ENDOLUMINL IVUS OCT C 1ST: CPT | Performed by: INTERNAL MEDICINE

## 2024-06-08 PROCEDURE — 93306 TTE W/DOPPLER COMPLETE: CPT | Performed by: INTERNAL MEDICINE

## 2024-06-08 PROCEDURE — 93356 MYOCRD STRAIN IMG SPCKL TRCK: CPT | Performed by: INTERNAL MEDICINE

## 2024-06-08 PROCEDURE — 93306 TTE W/DOPPLER COMPLETE: CPT

## 2024-06-08 PROCEDURE — 85730 THROMBOPLASTIN TIME PARTIAL: CPT | Performed by: INTERNAL MEDICINE

## 2024-06-08 PROCEDURE — C1887 CATHETER, GUIDING: HCPCS | Performed by: INTERNAL MEDICINE

## 2024-06-08 PROCEDURE — 25010000002 MIDAZOLAM PER 1 MG: Performed by: INTERNAL MEDICINE

## 2024-06-08 PROCEDURE — 93005 ELECTROCARDIOGRAM TRACING: CPT | Performed by: STUDENT IN AN ORGANIZED HEALTH CARE EDUCATION/TRAINING PROGRAM

## 2024-06-08 PROCEDURE — B2111ZZ FLUOROSCOPY OF MULTIPLE CORONARY ARTERIES USING LOW OSMOLAR CONTRAST: ICD-10-PCS | Performed by: INTERNAL MEDICINE

## 2024-06-08 PROCEDURE — 85347 COAGULATION TIME ACTIVATED: CPT

## 2024-06-08 PROCEDURE — 80048 BASIC METABOLIC PNL TOTAL CA: CPT | Performed by: STUDENT IN AN ORGANIZED HEALTH CARE EDUCATION/TRAINING PROGRAM

## 2024-06-08 PROCEDURE — C1753 CATH, INTRAVAS ULTRASOUND: HCPCS | Performed by: INTERNAL MEDICINE

## 2024-06-08 PROCEDURE — C9600 PERC DRUG-EL COR STENT SING: HCPCS | Performed by: INTERNAL MEDICINE

## 2024-06-08 PROCEDURE — C1760 CLOSURE DEV, VASC: HCPCS | Performed by: INTERNAL MEDICINE

## 2024-06-08 PROCEDURE — 25510000001 IOPAMIDOL PER 1 ML: Performed by: INTERNAL MEDICINE

## 2024-06-08 PROCEDURE — 027034Z DILATION OF CORONARY ARTERY, ONE ARTERY WITH DRUG-ELUTING INTRALUMINAL DEVICE, PERCUTANEOUS APPROACH: ICD-10-PCS | Performed by: INTERNAL MEDICINE

## 2024-06-08 DEVICE — XIENCE SKYPOINT™ EVEROLIMUS ELUTING CORONARY STENT SYSTEM 3.00 MM X 28 MM / RAPID-EXCHANGE
Type: IMPLANTABLE DEVICE | Site: CORONARY | Status: FUNCTIONAL
Brand: XIENCE SKYPOINT™

## 2024-06-08 RX ORDER — NITROGLYCERIN 0.4 MG/1
0.4 TABLET SUBLINGUAL
Status: DISCONTINUED | OUTPATIENT
Start: 2024-06-08 | End: 2024-06-09 | Stop reason: HOSPADM

## 2024-06-08 RX ORDER — ONDANSETRON 2 MG/ML
4 INJECTION INTRAMUSCULAR; INTRAVENOUS EVERY 6 HOURS PRN
Status: DISCONTINUED | OUTPATIENT
Start: 2024-06-08 | End: 2024-06-09 | Stop reason: HOSPADM

## 2024-06-08 RX ORDER — ACETAMINOPHEN 325 MG/1
650 TABLET ORAL EVERY 4 HOURS PRN
Status: DISCONTINUED | OUTPATIENT
Start: 2024-06-08 | End: 2024-06-09 | Stop reason: HOSPADM

## 2024-06-08 RX ORDER — FENTANYL CITRATE 50 UG/ML
INJECTION, SOLUTION INTRAMUSCULAR; INTRAVENOUS
Status: DISCONTINUED | OUTPATIENT
Start: 2024-06-08 | End: 2024-06-08 | Stop reason: HOSPADM

## 2024-06-08 RX ORDER — DIPHENHYDRAMINE HCL 25 MG
25 CAPSULE ORAL EVERY 6 HOURS PRN
Status: DISCONTINUED | OUTPATIENT
Start: 2024-06-08 | End: 2024-06-09 | Stop reason: HOSPADM

## 2024-06-08 RX ORDER — LISINOPRIL 5 MG/1
5 TABLET ORAL
Status: DISCONTINUED | OUTPATIENT
Start: 2024-06-08 | End: 2024-06-09 | Stop reason: HOSPADM

## 2024-06-08 RX ORDER — HEPARIN SODIUM 1000 [USP'U]/ML
INJECTION, SOLUTION INTRAVENOUS; SUBCUTANEOUS
Status: DISCONTINUED | OUTPATIENT
Start: 2024-06-08 | End: 2024-06-08 | Stop reason: HOSPADM

## 2024-06-08 RX ORDER — ONDANSETRON 4 MG/1
4 TABLET, ORALLY DISINTEGRATING ORAL EVERY 6 HOURS PRN
Status: DISCONTINUED | OUTPATIENT
Start: 2024-06-08 | End: 2024-06-09 | Stop reason: HOSPADM

## 2024-06-08 RX ORDER — MIDAZOLAM HYDROCHLORIDE 1 MG/ML
INJECTION INTRAMUSCULAR; INTRAVENOUS
Status: DISCONTINUED | OUTPATIENT
Start: 2024-06-08 | End: 2024-06-08 | Stop reason: HOSPADM

## 2024-06-08 RX ORDER — LIDOCAINE HYDROCHLORIDE 20 MG/ML
INJECTION, SOLUTION INFILTRATION; PERINEURAL
Status: DISCONTINUED | OUTPATIENT
Start: 2024-06-08 | End: 2024-06-08 | Stop reason: HOSPADM

## 2024-06-08 RX ORDER — METOPROLOL SUCCINATE 25 MG/1
25 TABLET, EXTENDED RELEASE ORAL
Status: DISCONTINUED | OUTPATIENT
Start: 2024-06-08 | End: 2024-06-09 | Stop reason: HOSPADM

## 2024-06-08 RX ORDER — NITROGLYCERIN 5 MG/ML
INJECTION, SOLUTION INTRAVENOUS
Status: DISCONTINUED | OUTPATIENT
Start: 2024-06-08 | End: 2024-06-08 | Stop reason: HOSPADM

## 2024-06-08 RX ORDER — SODIUM CHLORIDE 9 MG/ML
INJECTION, SOLUTION INTRAVENOUS
Status: COMPLETED | OUTPATIENT
Start: 2024-06-08 | End: 2024-06-08

## 2024-06-08 RX ADMIN — Medication 10 ML: at 21:11

## 2024-06-08 RX ADMIN — ATORVASTATIN CALCIUM 80 MG: 40 TABLET, FILM COATED ORAL at 21:11

## 2024-06-08 RX ADMIN — ACETAMINOPHEN 650 MG: 325 TABLET, FILM COATED ORAL at 21:11

## 2024-06-08 RX ADMIN — TICAGRELOR 90 MG: 90 TABLET ORAL at 14:39

## 2024-06-08 RX ADMIN — ASPIRIN 81 MG: 81 TABLET, COATED ORAL at 07:42

## 2024-06-08 RX ADMIN — Medication 3 ML: at 07:45

## 2024-06-08 RX ADMIN — ATROPINE SULFATE 0.5 MG: 0.1 INJECTION PARENTERAL at 14:32

## 2024-06-08 RX ADMIN — Medication 10 ML: at 07:45

## 2024-06-08 RX ADMIN — SODIUM CHLORIDE 1000 ML: 9 INJECTION, SOLUTION INTRAVENOUS at 14:30

## 2024-06-08 RX ADMIN — METOPROLOL SUCCINATE 25 MG: 25 TABLET, EXTENDED RELEASE ORAL at 07:42

## 2024-06-08 NOTE — NURSING NOTE
Pt was sitting up at 30 degree and during routine check blood noted to r groin dressing.  Area still soft.  Blood marked and rechecked and more bleeding noted. Pt layed flat and while removing dressing pt stated he felt sick tehn pt HR dropped to 29 and pt not responding. Fast team called.  Pushing fluids. Dr Solorzano at bedside and holding pressure.  Atropine given per orders and later another 90 of brilinta PO when pt was able to take. EKG obtained.  Pt started comeing around and getting his color back.   No /77  HR SR at 88.  Fm at bedside.  Pt will restart bedsrest.  Will cont to monitor

## 2024-06-08 NOTE — PLAN OF CARE
Problem: Adult Inpatient Plan of Care  Goal: Plan of Care Review  Outcome: Ongoing, Progressing  Flowsheets (Taken 6/8/2024 0500)  Plan of Care Reviewed With: patient   Goal Outcome Evaluation:  Plan of Care Reviewed With: patient

## 2024-06-08 NOTE — H&P
[facility] Medicine Services    Hospitalist History and Physical     Logan Huitron : 1970 MRN:[id number] LOS:0 ROOM: [facility]/1     Chief Complaint: Chest pain    Assessment / Plan     #NSTEMI  #Unstable Angina    - HS troponin 662 > 2156, delta 1494, will treat as NSTEMI    - proBNP 665.7    - lipase 62    - EKG NSR with slight ST changes in V2-V5    - aspirin    -statin    -  heparin drip started at , continue, monitor for bleeding    - cardiology notified, plan for heart cath tomorrow morning    - A1c, lipid panel, tsh    - patient AAOx4 and hemodynamically stable    - Patient high risk      Code Status (Patient has no pulse and is not breathing): CPR (Attempt to Resuscitate)  Medical Interventions (Patient has pulse or is breathing): Full Support         DVT prophylaxis: Heparin drip  Medical DVT prophylaxis orders are present.         History of Present illness     Logan Huitron is a 53 y.o. male who presented to  for chest pain.  Admits to episodic 4/10 chest pain ongoing for the past two days that suddenly worsened while he was playing golf today.  Denies numbness, chills, fevers, dyspnea, vomiting, diarrhea.  Presented to  for evaluation.      ED course: At , labs 98F, HR 84, /90, RR 16, 98% RA.  Labs notable for HS troponin 662, AST 80, lipase 62.  EKG NSR with slight ST changes in V2-V5. CXR without acute cardiopulmonary process.  Cardiology contacted, patient started on heparin drip. Transferred to [facility] for cardiac evaluation.    Patient was seen and examined on 24 at 21:04 EDT .    Subjective / Review of systems     Review of Systems   12 point ROS reviewed and negative except as mentioned above      Past Medical/Surgical/Social/Family History & Allergies     History reviewed. No pertinent past medical history.   Past Surgical History:   Procedure Laterality Date    HAND SURGERY      TONSILLECTOMY        Social History     Socioeconomic History    Marital status: Single    Tobacco Use    Smoking status: Former     Current packs/day: 0.00     Average packs/day: 0.5 packs/day for 2.0 years (1.0 ttl pk-yrs)     Types: Cigarettes     Start date: 1989     Quit date: 1991     Years since quittin.6    Smokeless tobacco: Former     Types: Chew     Quit date:    Vaping Use    Vaping status: Never Used   Substance and Sexual Activity    Alcohol use: Yes     Alcohol/week: 6.0 standard drinks of alcohol     Types: 6 Cans of beer per week    Drug use: Never    Sexual activity: Defer      Family History   Problem Relation Age of Onset    No Known Problems Mother     No Known Problems Father       No Known Allergies   Social Determinants of Health     Tobacco Use: Medium Risk (2024)    Patient History     Smoking Tobacco Use: Former     Smokeless Tobacco Use: Former     Passive Exposure: Not on file   Alcohol Use: Not on file   Financial Resource Strain: Not on file   Food Insecurity: Not on file   Transportation Needs: Not on file   Physical Activity: Not on file   Stress: Not on file   Social Connections: Unknown (10/10/2023)    Family and Community Support     Help with Day-to-Day Activities: Not on file     Lonely or Isolated: Not on file   Interpersonal Safety: Not At Risk (2024)    Abuse Screen     Unsafe at Home or Work/School: no     Feels Threatened by Someone?: no     Does Anyone Keep You from Contacting Others or Doint Things Outside the Home?: no     Physical Sign of Abuse Present: no   Depression: Not at risk (2023)    PHQ-2     PHQ-2 Score: 0   Housing Stability: Unknown (10/10/2023)    Housing Stability     Current Living Arrangements: Not on file     Potentially Unsafe Housing Conditions: Not on file   Utilities: Not on file   Health Literacy: Unknown (10/10/2023)    Education     Help with school or training?: Not on file     Preferred Language: Not on file   Employment: Unknown (10/10/2023)    Employment     Do you want help finding or keeping work or  a job?: Not on file   Disabilities: Unknown (10/10/2023)    Disabilities     Concentrating, Remembering, or Making Decisions Difficulty: Not on file     Doing Errands Independently Difficulty: Not on file        Home Medications     Prior to Admission medications    Medication Sig Start Date End Date Taking? Authorizing Provider   amoxicillin-clavulanate (AUGMENTIN) 875-125 MG per tablet Take 1 tablet by mouth 2 (Two) Times a Day. 2/14/24   Mikey Keller MD        Objective / Physical Exam     Vital signs:  Temp: 98.2 °F (36.8 °C)  BP: 125/94  Heart Rate: 96  Resp: 13  SpO2: 97 %  Weight: 92.5 kg (204 lb)    Admission Weight: Weight: 92.5 kg (204 lb)    Physical Exam  Constitutional:       General: He is not in acute distress.     Appearance: Normal appearance. He is not toxic-appearing.   HENT:      Head: Normocephalic and atraumatic.      Nose: Nose normal. No congestion.      Mouth/Throat:      Pharynx: Oropharynx is clear. No oropharyngeal exudate.   Eyes:      General: No scleral icterus.  Cardiovascular:      Rate and Rhythm: Normal rate and regular rhythm.      Heart sounds: No murmur heard.     No friction rub. No gallop.   Pulmonary:      Effort: No respiratory distress.      Breath sounds: No wheezing or rales.   Abdominal:      General: There is no distension.      Tenderness: There is no abdominal tenderness. There is no guarding.   Musculoskeletal:         General: No swelling or deformity.      Cervical back: Normal range of motion. No rigidity.      Right lower leg: No edema.      Left lower leg: No edema.   Skin:     Coloration: Skin is not jaundiced.      Findings: No bruising or lesion.   Neurological:      General: No focal deficit present.      Mental Status: He is alert and oriented to person, place, and time.      Motor: No weakness.            Labs     Results from last 7 days   Lab Units 06/07/24  1533   WBC 10*3/mm3 10.29   HEMOGLOBIN g/dL 14.1   HEMATOCRIT % 42.9   PLATELETS 10*3/mm3  275      Results from last 7 days   Lab Units 06/07/24  1551   ALK PHOS U/L 101   AST (SGOT) U/L 80*   ALT (SGPT) U/L 32      Results from last 7 days   Lab Units 06/07/24  1706 06/07/24  1533   PROTIME seconds 11.0  --    INR  1  --    APTT seconds  --  26.5*      Results from last 7 days   Lab Units 06/07/24  1551   SODIUM mmol/L 136   POTASSIUM mmol/L 3.7   CHLORIDE mmol/L 101   CO2 mmol/L 24.1   BUN mg/dL 19   CREATININE mg/dL 1.02   GLUCOSE mg/dL 96        Imaging     XR Chest 2 View    Result Date: 6/7/2024  XR CHEST 2 VW Date of Exam: 6/7/2024 4:00 PM EDT Indication: Chest Pain Triage Protocol Comparison: None available. Findings: No acute airspace disease. Normal heart size. No pleural effusion, pneumothorax or acute osseous abnormality.     Impression: No acute chest finding. Electronically Signed: Park Escoto MD  6/7/2024 4:13 PM EDT  Workstation ID: [id number]        Current Medications     Scheduled Meds:  [START ON 6/8/2024] aspirin, 81 mg, Oral, Daily  atorvastatin, 80 mg, Oral, Nightly  sodium chloride, 10 mL, Intravenous, Q12H  sodium chloride, 3 mL, Intravenous, Q12H         Continuous Infusions:  heparin, 10.8 Units/kg/hr  sodium chloride, 75 mL/hr, Last Rate: 75 mL/hr (06/07/24 2059)           Saul Radford [facility]  06/07/24   21:04 EDT

## 2024-06-08 NOTE — PROGRESS NOTES
[facility] MEDICINE SERVICE  DAILY PROGRESS NOTE    NAME: Logan Huitron  : 1970  MRN: [id number]      LOS: 1 day     PROVIDER OF SERVICE: Silverio Nolen MD    Chief Complaint: ACS (acute coronary syndrome)  Logan Huitron is a 53 y.o. male who presented to  for chest pain.  Admits to episodic 4/10 chest pain ongoing for the past two days that suddenly worsened while he was playing golf today.   Subjective:     Interval History:  History taken from: patient  Patient Complaints: No new complaint feeling tired  Patient Denies: Chest pain or shortness of breath    Review of Systems:   Review of Systems  14 point review of system unremarkable except mentioned above  Objective:     Vital Signs  Temp:  [98 °F (36.7 °C)-99.5 °F (37.5 °C)] 99.5 °F (37.5 °C)  Heart Rate:  [54-99] 97  Resp:  [13-17] 13  BP: ()/() 119/83  Flow (L/min):  [1-2] 1   Body mass index is 28.14 kg/m².    Physical Exam  Physical Exam  HENT:      Head: Atraumatic.      Mouth/Throat:      Mouth: Mucous membranes are moist.   Eyes:      Pupils: Pupils are equal, round, and reactive to light.   Cardiovascular:      Pulses: Normal pulses.      Heart sounds: Normal heart sounds.   Pulmonary:      Breath sounds: Normal breath sounds.   Abdominal:      General: Abdomen is flat.      Palpations: Abdomen is soft.   Musculoskeletal:         General: Normal range of motion.      Cervical back: Neck supple.   Skin:     Comments: Rt groin dressing with minimal bleeding    Neurological:      General: No focal deficit present.      Mental Status: He is alert.   Psychiatric:         Mood and Affect: Mood normal.         Scheduled Meds   aspirin, 81 mg, Oral, Daily  atorvastatin, 80 mg, Oral, Nightly  metoprolol succinate XL, 25 mg, Oral, Q24H  sodium chloride, 10 mL, Intravenous, Q12H  sodium chloride, 3 mL, Intravenous, Q12H       PRN Meds     Calcium Replacement - Follow Nurse / BPA Driven Protocol    heparin    heparin    Magnesium  Standard Dose Replacement - Follow Nurse / BPA Driven Protocol    nitroglycerin    ondansetron ODT **OR** ondansetron    Phosphorus Replacement - Follow Nurse / BPA Driven Protocol    Potassium Replacement - Follow Nurse / BPA Driven Protocol    sodium chloride    sodium chloride    sodium chloride    sodium chloride    sodium chloride   Infusions  heparin, 10.8 Units/kg/hr, Last Rate: 11.8 Units/kg/hr (06/08/24 0641)  sodium chloride, 75 mL/hr, Last Rate: 75 mL/hr (06/07/24 2059)          Diagnostic Data    Results from last 7 days   Lab Units 06/08/24  0550 06/07/24  2259 06/07/24  1551   WBC 10*3/mm3  --  10.50  --    HEMOGLOBIN g/dL  --  13.0  --    HEMATOCRIT %  --  39.1  --    PLATELETS 10*3/mm3  --  186  --    GLUCOSE mg/dL 113*  --  96   CREATININE mg/dL 0.86  --  1.02   BUN mg/dL 12  --  19   SODIUM mmol/L 137  --  136   POTASSIUM mmol/L 3.9  --  3.7   AST (SGOT) U/L  --   --  80*   ALT (SGPT) U/L  --   --  32   ALK PHOS U/L  --   --  101   BILIRUBIN mg/dL  --   --  0.2   ANION GAP mmol/L 8.9  --  10.9       XR Chest 2 View    Result Date: 6/7/2024  Impression: No acute chest finding. Electronically Signed: Park Escoto MD  6/7/2024 4:13 PM EDT  Workstation ID: [id number]       I reviewed the patient's new clinical results.    Assessment/Plan:     Active and Resolved Problems  #ACS with an NSTEMI  Presented with chest pain  - Elevated troponin trending up  - EKG NSR with slight ST-T wave changes on V2-V5  - Started on aspirin statin on heparin drip monitor for toxicity  - Cardiology team consulted for cardiac cath s/p LC   LV angiogram left ventricular hypokinesis with EF of 50-55% status post PCI showing acute MI LAD with LC placement.  Recommended DAPT, high intensity statin, beta-blocker and ACE inhibitor as well as Cardiografin follow-up and discharged    -echoCardiogram  F 46-50% mild MR  -Cardiac rehab on discharge    #Hyperlipidemia  - Started on statin    Cardiac rehab on discharge    DVT  prophylaxis:  Medical DVT prophylaxis orders are present.         Code status is   Code Status and Medical Interventions:   Ordered at: 06/07/24 2049     Code Status (Patient has no pulse and is not breathing):    CPR (Attempt to Resuscitate)     Medical Interventions (Patient has pulse or is breathing):    Full Support       Plan for disposition: home  in am per cardiology recs after overnight monitoring     Time: 35 minutes    Signature: Electronically signed by Silverio Nolen MD, 06/08/24, 07:46 EDT.  [facility] Hospitalist Team

## 2024-06-08 NOTE — CONSULTS
Referring Provider: Silverio Nolen MD  Reason for Consultation:  Unstable angina  Non-STEMI    Patient Care Team:  Mikey Keller MD as PCP - General (Internal Medicine)    Chief complaint  Chest pain    Subjective .     History of present illness:  Logan Huitron is a 53 y.o. male who presents with history of wrist pain.  Patient was playing golf on Wednesday and yesterday when he started having chest discomfort substernal heaviness and tightness without any radiation of the discomfort into the neck or into the arms.  Patient did not seek medical help and patient went to [facility] yesterday where he was found to have anterior loss of R wave and elevated troponin.  Patient maintain pain-free status.  Patient was started on intravenous heparin.  Patient was transferred last night from [facility] to [facility] for further care.  Cardiology consultation was requested.  Patient remained asymptomatic.  Patient apparently drinks a sixpack of beer every day.    ROS      Today, the patient has been without any chest discomfort, shortness of breath, palpitations, dizziness or syncope.  Denies having any headache, abdominal pain, nausea, vomiting, diarrhea, constipation, loss of weight or loss of appetite.  Denies having any excessive bruising, hematuria or blood in the stool.    Review of all systems negative except as indicated      History  History reviewed. No pertinent past medical history.    Past Surgical History:   Procedure Laterality Date    HAND SURGERY      TONSILLECTOMY         Family History   Problem Relation Age of Onset    No Known Problems Mother     No Known Problems Father        Social History     Tobacco Use    Smoking status: Former     Current packs/day: 0.00     Average packs/day: 0.5 packs/day for 2.0 years (1.0 ttl pk-yrs)     Types: Cigarettes     Start date: 1989     Quit date: 1991     Years since quittin.6    Smokeless tobacco: Former     Types: Chew      "Quit date: 2022   Vaping Use    Vaping status: Never Used   Substance Use Topics    Alcohol use: Yes     Alcohol/week: 6.0 standard drinks of alcohol     Types: 6 Cans of beer per week    Drug use: Never        Medications Prior to Admission   Medication Sig Dispense Refill Last Dose    amoxicillin-clavulanate (AUGMENTIN) 875-125 MG per tablet Take 1 tablet by mouth 2 (Two) Times a Day. 14 tablet 0          Patient has no known allergies.    Scheduled Meds:aspirin, 81 mg, Oral, Daily  atorvastatin, 80 mg, Oral, Nightly  sodium chloride, 10 mL, Intravenous, Q12H  sodium chloride, 3 mL, Intravenous, Q12H      Continuous Infusions:heparin, 10.8 Units/kg/hr, Last Rate: 13.8 Units/kg/hr (06/07/24 2344)  sodium chloride, 75 mL/hr, Last Rate: 75 mL/hr (06/07/24 2059)      PRN Meds:.  Calcium Replacement - Follow Nurse / BPA Driven Protocol    heparin    heparin    Magnesium Standard Dose Replacement - Follow Nurse / BPA Driven Protocol    nitroglycerin    ondansetron ODT **OR** ondansetron    Phosphorus Replacement - Follow Nurse / BPA Driven Protocol    Potassium Replacement - Follow Nurse / BPA Driven Protocol    sodium chloride    sodium chloride    sodium chloride    sodium chloride    sodium chloride    Objective     VITAL SIGNS  Vitals:    06/07/24 2047 06/07/24 2050 06/08/24 0003 06/08/24 0539   BP: 125/94  123/85 119/83   BP Location: Left arm  Left arm Left arm   Patient Position: Lying  Lying Lying   Pulse: 96  98 97   Resp: 13  17 13   Temp: 98.2 °F (36.8 °C)  99.1 °F (37.3 °C) 99.5 °F (37.5 °C)   TempSrc: Oral  Oral Oral   SpO2: 97%  97% 97%   Weight:  94.1 kg (207 lb 7.3 oz)     Height:           Flowsheet Rows      Flowsheet Row First Filed Value   Admission Height 182.9 cm (72\") Documented at 06/07/2024 1524   Admission Weight 92.5 kg (204 lb) Documented at 06/07/2024 1524              Intake/Output Summary (Last 24 hours) at 6/8/2024 0553  Last data filed at 6/8/2024 0539  Gross per 24 hour   Intake 1240 ml "   Output 900 ml   Net 340 ml        TELEMETRY: Sinus rhythm    Physical Exam:  The patient is alert, oriented and in no distress.  Vital signs as noted above.  Head and neck revealed no carotid bruits or jugular venous distention.  No thyromegaly or lymphadenopathy is present  Lungs clear.  No wheezing.  Breath sounds are normal bilaterally.  Heart normal first and second heart sounds. No murmur.  No precordial rub is present.  No gallop is present.  Abdomen soft and nontender.  No organomegaly is present.  Extremities with good peripheral pulses without any pedal edema.  Skin warm and dry.  Musculoskeletal system is grossly normal  CNS grossly normal    Reviewed and updated.    Results Review:   I reviewed the patient's new clinical results.  Lab Results (last 24 hours)       Procedure Component Value Units Date/Time    Basic Metabolic Panel [[id number]] Collected: 06/08/24 0550    Specimen: Blood from Arm, Left Updated: 06/08/24 0551    Hemoglobin A1c [[id number]]  (Normal) Collected: 06/07/24 2259    Specimen: Blood from Arm, Left Updated: 06/08/24 0000     Hemoglobin A1C 5.42 %     aPTT [[id number]]  (Abnormal) Collected: 06/07/24 2259    Specimen: Blood from Arm, Left Updated: 06/07/24 2330     PTT 34.8 seconds     Protime-INR [[id number]]  (Normal) Collected: 06/07/24 2259    Specimen: Blood from Arm, Left Updated: 06/07/24 2330     Protime 10.6 Seconds      INR 0.97    CBC & Differential [[id number]]  (Abnormal) Collected: 06/07/24 2259    Specimen: Blood from Arm, Left Updated: 06/07/24 2307    Narrative:      The following orders were created for panel order CBC & Differential.  Procedure                               Abnormality         Status                     ---------                               -----------         ------                     CBC Auto Differential[[id number]]        Abnormal            Final result                 Please view results for these tests on the individual orders.    CBC Auto  Differential [[id number]]  (Abnormal) Collected: 06/07/24 2259    Specimen: Blood from Arm, Left Updated: 06/07/24 2307     WBC 10.50 10*3/mm3      RBC 4.04 10*6/mm3      Hemoglobin 13.0 g/dL      Hematocrit 39.1 %      MCV 96.8 fL      MCH 32.2 pg      MCHC 33.2 g/dL      RDW 12.4 %      RDW-SD 44.3 fl      MPV 10.1 fL      Platelets 186 10*3/mm3      Neutrophil % 71.6 %      Lymphocyte % 17.2 %      Monocyte % 10.1 %      Eosinophil % 0.4 %      Basophil % 0.4 %      Immature Grans % 0.3 %      Neutrophils, Absolute 7.52 10*3/mm3      Lymphocytes, Absolute 1.81 10*3/mm3      Monocytes, Absolute 1.06 10*3/mm3      Eosinophils, Absolute 0.04 10*3/mm3      Basophils, Absolute 0.04 10*3/mm3      Immature Grans, Absolute 0.03 10*3/mm3      nRBC 0.0 /100 WBC     High Sensitivity Troponin T [[id number]]  (Abnormal) Collected: 06/07/24 2110    Specimen: Blood from Arm, Left Updated: 06/07/24 2154     HS Troponin T 2,516 ng/L     Narrative:      High Sensitive Troponin T Reference Range:  <14.0 ng/L- Negative Female for AMI  <22.0 ng/L- Negative Male for AMI  >=14 - Abnormal Female indicating possible myocardial injury.  >=22 - Abnormal Male indicating possible myocardial injury.   Clinicians would have to utilize clinical acumen, EKG, Troponin, and serial changes to determine if it is an Acute Myocardial Infarction or myocardial injury due to an underlying chronic condition.         Lipid Panel [[id number]]  (Abnormal) Collected: 06/07/24 2110    Specimen: Blood from Arm, Left Updated: 06/07/24 2153     Total Cholesterol 235 mg/dL      Triglycerides 369 mg/dL      HDL Cholesterol 35 mg/dL      LDL Cholesterol  133 mg/dL      VLDL Cholesterol 67 mg/dL      LDL/HDL Ratio 3.61    Narrative:      Cholesterol Reference Ranges  (U.S. Department of Health and Human Services ATP III Classifications)    Desirable          <200 mg/dL  Borderline High    200-239 mg/dL  High Risk          >240 mg/dL      Triglyceride Reference  Ranges  (U.S. Department of Health and Human Services ATP III Classifications)    Normal           <150 mg/dL  Borderline High  150-199 mg/dL  High             200-499 mg/dL  Very High        >500 mg/dL    HDL Reference Ranges  (U.S. Department of Health and Human Services ATP III Classifications)    Low     <40 mg/dl (major risk factor for CHD)  High    >60 mg/dl ('negative' risk factor for CHD)        LDL Reference Ranges  (U.S. Department of Health and Human Services ATP III Classifications)    Optimal          <100 mg/dL  Near Optimal     100-129 mg/dL  Borderline High  130-159 mg/dL  High             160-189 mg/dL  Very High        >189 mg/dL    TSH [[id number]]  (Normal) Collected: 06/07/24 2110    Specimen: Blood from Arm, Left Updated: 06/07/24 2153     TSH 3.230 uIU/mL     Protime-INR [[id number]]  (Normal) Collected: 06/07/24 2110    Specimen: Blood from Arm, Left Updated: 06/07/24 2149     Protime 10.5 Seconds      INR 0.96    BNP [[id number]]  (Normal) Collected: 06/07/24 1835    Specimen: Blood Updated: 06/07/24 2127     proBNP 665.7 pg/mL     Narrative:      This assay is used as an aid in the diagnosis of individuals suspected of having heart failure. It can be used as an aid in the diagnosis of acute decompensated heart failure (ADHF) in patients presenting with signs and symptoms of ADHF to the emergency department (ED). In addition, NT-proBNP of <300 pg/mL indicates ADHF is not likely.    Age Range Result Interpretation  NT-proBNP Concentration (pg/mL:      <50             Positive            >450                   Gray                 300-450                    Negative             <300    50-75           Positive            >900                  Gray                300-900                  Negative            <300      >75             Positive            >1800                  Gray                300-1800                  Negative            <300    Magnesium [[id number]]  (Normal) Collected:  06/07/24 1835    Specimen: Blood Updated: 06/07/24 2111     Magnesium 2.1 mg/dL     High Sensitivity Troponin T 2Hr [[id number]]  (Abnormal) Collected: 06/07/24 1835    Specimen: Blood Updated: 06/07/24 1856     HS Troponin T 2,156 ng/L      Troponin T Delta 1,494 ng/L     Narrative:      High Sensitive Troponin T Reference Range:  <14.0 ng/L- Negative Female for AMI  <22.0 ng/L- Negative Male for AMI  >=14 - Abnormal Female indicating possible myocardial injury.  >=22 - Abnormal Male indicating possible myocardial injury.   Clinicians would have to utilize clinical acumen, EKG, Troponin, and serial changes to determine if it is an Acute Myocardial Infarction or myocardial injury due to an underlying chronic condition.         aPTT [[id number]]  (Abnormal) Collected: 06/07/24 1533    Specimen: Blood Updated: 06/07/24 1842     PTT 26.5 seconds     POC Protime / INR [[id number]] Collected: 06/07/24 1706    Specimen: Blood from Hand, Left Updated: 06/07/24 1706     Protime 11.0 seconds      INR 1    Comprehensive Metabolic Panel [[id number]]  (Abnormal) Collected: 06/07/24 1551    Specimen: Blood Updated: 06/07/24 1612     Glucose 96 mg/dL      BUN 19 mg/dL      Creatinine 1.02 mg/dL      Sodium 136 mmol/L      Potassium 3.7 mmol/L      Chloride 101 mmol/L      CO2 24.1 mmol/L      Calcium 9.5 mg/dL      Total Protein 6.0 g/dL      Albumin 4.2 g/dL      ALT (SGPT) 32 U/L      AST (SGOT) 80 U/L      Alkaline Phosphatase 101 U/L      Total Bilirubin 0.2 mg/dL      Globulin 1.8 gm/dL      A/G Ratio 2.3 g/dL      BUN/Creatinine Ratio 18.6     Anion Gap 10.9 mmol/L      eGFR 87.3 mL/min/1.73     Narrative:      GFR Normal >60  Chronic Kidney Disease <60  Kidney Failure <15      Lipase [[id number]]  (Abnormal) Collected: 06/07/24 1551    Specimen: Blood Updated: 06/07/24 1612     Lipase 62 U/L     High Sensitivity Troponin T [[id number]]  (Abnormal) Collected: 06/07/24 1551    Specimen: Blood Updated: 06/07/24 1608     HS Troponin  T 662 ng/L     Narrative:      High Sensitive Troponin T Reference Range:  <14.0 ng/L- Negative Female for AMI  <22.0 ng/L- Negative Male for AMI  >=14 - Abnormal Female indicating possible myocardial injury.  >=22 - Abnormal Male indicating possible myocardial injury.   Clinicians would have to utilize clinical acumen, EKG, Troponin, and serial changes to determine if it is an Acute Myocardial Infarction or myocardial injury due to an underlying chronic condition.         Tyler Draw [[id number]] Collected: 06/07/24 1533    Specimen: Blood Updated: 06/07/24 1546    Narrative:      The following orders were created for panel order Tyler Draw.  Procedure                               Abnormality         Status                     ---------                               -----------         ------                     Green Top (Gel)[[id number]]                                  Final result               Lavender Top[[id number]]                                     Final result               Gold Top - SST[[id number]]                                   Final result               Light Blue Top[[id number]]                                   Final result               Green Top (Gel)[[id number]]                                                               Please view results for these tests on the individual orders.    Green Top (Gel) [[id number]] Collected: 06/07/24 1533    Specimen: Blood Updated: 06/07/24 1546     Extra Tube Hold for add-ons.     Comment: Auto resulted.       Lavender Top [[id number]] Collected: 06/07/24 1533    Specimen: Blood Updated: 06/07/24 1546     Extra Tube hold for add-on     Comment: Auto resulted       Gold Top - SST [[id number]] Collected: 06/07/24 1533    Specimen: Blood Updated: 06/07/24 1546     Extra Tube Hold for add-ons.     Comment: Auto resulted.       Light Blue Top [[id number]] Collected: 06/07/24 1533    Specimen: Blood Updated: 06/07/24 1546     Extra Tube Hold for add-ons.     Comment:  Auto resulted       CBC & Differential [[id number]]  (Abnormal) Collected: 06/07/24 1533    Specimen: Blood Updated: 06/07/24 1538    Narrative:      The following orders were created for panel order CBC & Differential.  Procedure                               Abnormality         Status                     ---------                               -----------         ------                     CBC Auto Differential[[id number]]        Abnormal            Final result                 Please view results for these tests on the individual orders.    CBC Auto Differential [[id number]]  (Abnormal) Collected: 06/07/24 1533    Specimen: Blood Updated: 06/07/24 1538     WBC 10.29 10*3/mm3      RBC 4.40 10*6/mm3      Hemoglobin 14.1 g/dL      Hematocrit 42.9 %      MCV 97.5 fL      MCH 32.0 pg      MCHC 32.9 g/dL      RDW 12.3 %      RDW-SD 44.4 fl      MPV 9.0 fL      Platelets 275 10*3/mm3      Neutrophil % 69.8 %      Lymphocyte % 19.1 %      Monocyte % 9.7 %      Eosinophil % 0.9 %      Basophil % 0.4 %      Immature Grans % 0.1 %      Neutrophils, Absolute 7.18 10*3/mm3      Lymphocytes, Absolute 1.97 10*3/mm3      Monocytes, Absolute 1.00 10*3/mm3      Eosinophils, Absolute 0.09 10*3/mm3      Basophils, Absolute 0.04 10*3/mm3      Immature Grans, Absolute 0.01 10*3/mm3             Imaging Results (Last 24 Hours)       Procedure Component Value Units Date/Time    XR Chest 2 View [[id number]] Collected: 06/07/24 1613     Updated: 06/07/24 1615    Narrative:      XR CHEST 2 VW    Date of Exam: 6/7/2024 4:00 PM EDT    Indication: Chest Pain Triage Protocol    Comparison: None available.    Findings:  No acute airspace disease. Normal heart size. No pleural effusion, pneumothorax or acute osseous abnormality.      Impression:      Impression:  No acute chest finding.      Electronically Signed: Park Escoto MD    6/7/2024 4:13 PM EDT    Workstation ID: [id number]        LAB RESULTS (LAST 7 DAYS)    CBC  Results from last 7 days    Lab Units 06/07/24 2259 06/07/24  1533   WBC 10*3/mm3 10.50 10.29   RBC 10*6/mm3 4.04* 4.40   HEMOGLOBIN g/dL 13.0 14.1   HEMATOCRIT % 39.1 42.9   MCV fL 96.8 97.5*   PLATELETS 10*3/mm3 186 275       BMP  Results from last 7 days   Lab Units 06/07/24  1835 06/07/24  1551   SODIUM mmol/L  --  136   POTASSIUM mmol/L  --  3.7   CHLORIDE mmol/L  --  101   CO2 mmol/L  --  24.1   BUN mg/dL  --  19   CREATININE mg/dL  --  1.02   GLUCOSE mg/dL  --  96   MAGNESIUM mg/dL 2.1  --        CMP   Results from last 7 days   Lab Units 06/07/24  1551   SODIUM mmol/L 136   POTASSIUM mmol/L 3.7   CHLORIDE mmol/L 101   CO2 mmol/L 24.1   BUN mg/dL 19   CREATININE mg/dL 1.02   GLUCOSE mg/dL 96   ALBUMIN g/dL 4.2   BILIRUBIN mg/dL 0.2   ALK PHOS U/L 101   AST (SGOT) U/L 80*   ALT (SGPT) U/L 32   LIPASE U/L 62*         BNP        TROPONIN  Results from last 7 days   Lab Units 06/07/24 2110   HSTROP T ng/L 2,516*       CoAg  Results from last 7 days   Lab Units 06/07/24 2259 06/07/24 2110 06/07/24  1706 06/07/24  1533   INR  0.97 0.96 1  --    APTT seconds 34.8*  --   --  26.5*       Creatinine Clearance  Estimated Creatinine Clearance: 98.6 mL/min (by C-G formula based on SCr of 1.02 mg/dL).    ABG        Radiology  XR Chest 2 View    Result Date: 6/7/2024  Impression: No acute chest finding. Electronically Signed: Park Escoto MD  6/7/2024 4:13 PM EDT  Workstation ID: [id number]       EKG                            I personally viewed and interpreted the patient's EKG/Telemetry data: Sinus rhythm poor R wave progression anteriorly suggestive of anteroseptal infarction age undetermined.  ST-T wave abnormalities-stable.    ECHOCARDIOGRAM:        STRESS TEST        Cardiolite (Tc-99m sestamibi) stress test    HEART CATHETERIZATION  No results found for this or any previous visit.      OTHER:     Assessment & Plan     Principal Problem:    ACS (acute coronary syndrome)    ]]]]]]]]]]]]]]]]]]]]]]]  Impression  ==============  -Unstable  angina  Non-STEMI.  Troponin [phone]    - Dyslipidemia  Cholesterol 235  VLDL 67  Start high-intensity statin.    - History of excessive alcohol consumption.  Patient apparently drinks 6 beers a day.    - History of hand surgery and tonsillectomy.    - Family history of coronary artery disease    - Former smoker.    - No known allergies    =================  Plan  =================  Patient is on intravenous heparin.  Patient to have an echocardiogram to assess left ventricular function..  Unstable angina-pain-free at this time  Non-STEMI  Patient to have cardiac catheterization and coronary arteriography soon possible.  Risks and benefits pros and cons of the procedure including infection bleeding blood clot heart attack stroke allergic reaction to the dye renal dysfunction etc. were discussed.    Start statins  Start beta-blocker    Further plan will depend on patient's progress.  ]]]]]]]]]]]]]]]]]]]]]]]]]      Cardiac catheterization 6/8/2024.  Left ventricular angiogram revealed left ventricular apical hypokinesis with ejection fraction of 50 to 55%.  1-2 placed mitral regurgitation is present.    Left main coronary artery is normal.  Left anterior descending artery has 99% disease with probable clot between the first and second diagonal branches.  Circumflex coronary artery is a large and codominant vessel and is normal.  Right coronary artery is a codominant vessel and is normal.      RECOMMENDATIONS:  Intervention to the left anterior descending artery.  Modification of risk factors.  Hopefully left ventricle function would improve with interventional revascularization.    Echocardiogram 6/8/2024.  Structurally and functionally normal cardiac valves except for mild mitral regurgitation..  Left ventricle size is normal with apical and periapical severe hypokinesis with estimated left ventricular ejection fraction of 40-45 %.  Possible small apical thrombus.  Normal left ventricular diastolic  function.  Left ventricular peak systolic longitudinal strain is abnormal with GL PS of -9%.    Pierre James MD  06/08/24  05:53 EDT

## 2024-06-08 NOTE — CODE DOCUMENTATION
"Rapid response called for bradycardia. Patient had recently come from cath lab, and had bleeding from cath site. Dressing was being changed, and patient stated he \"held his breath\" and then began feeling lightheaded and sweaty. Bradycardia was noted and rapid called. Dr. Solorzano at bedside. EKG obtained. Patient given 1L NS and 0.5 mg atropine. Patient became more talkative as HR and BP improved. Family at bedside. Patient to remain on unit at this time.  "

## 2024-06-08 NOTE — H&P (VIEW-ONLY)
Referring Provider: Silverio Nolen MD  Reason for Consultation:  Unstable angina  Non-STEMI    Patient Care Team:  Mikey Keller MD as PCP - General (Internal Medicine)    Chief complaint  Chest pain    Subjective .     History of present illness:  Logan Huitron is a 53 y.o. male who presents with history of wrist pain.  Patient was playing golf on Wednesday and yesterday when he started having chest discomfort substernal heaviness and tightness without any radiation of the discomfort into the neck or into the arms.  Patient did not seek medical help and patient went to [facility] yesterday where he was found to have anterior loss of R wave and elevated troponin.  Patient maintain pain-free status.  Patient was started on intravenous heparin.  Patient was transferred last night from [facility] to [facility] for further care.  Cardiology consultation was requested.  Patient remained asymptomatic.  Patient apparently drinks a sixpack of beer every day.    ROS      Today, the patient has been without any chest discomfort, shortness of breath, palpitations, dizziness or syncope.  Denies having any headache, abdominal pain, nausea, vomiting, diarrhea, constipation, loss of weight or loss of appetite.  Denies having any excessive bruising, hematuria or blood in the stool.    Review of all systems negative except as indicated      History  History reviewed. No pertinent past medical history.    Past Surgical History:   Procedure Laterality Date    HAND SURGERY      TONSILLECTOMY         Family History   Problem Relation Age of Onset    No Known Problems Mother     No Known Problems Father        Social History     Tobacco Use    Smoking status: Former     Current packs/day: 0.00     Average packs/day: 0.5 packs/day for 2.0 years (1.0 ttl pk-yrs)     Types: Cigarettes     Start date: 1989     Quit date: 1991     Years since quittin.6    Smokeless tobacco: Former     Types: Chew      "Quit date: 2022   Vaping Use    Vaping status: Never Used   Substance Use Topics    Alcohol use: Yes     Alcohol/week: 6.0 standard drinks of alcohol     Types: 6 Cans of beer per week    Drug use: Never        Medications Prior to Admission   Medication Sig Dispense Refill Last Dose    amoxicillin-clavulanate (AUGMENTIN) 875-125 MG per tablet Take 1 tablet by mouth 2 (Two) Times a Day. 14 tablet 0          Patient has no known allergies.    Scheduled Meds:aspirin, 81 mg, Oral, Daily  atorvastatin, 80 mg, Oral, Nightly  sodium chloride, 10 mL, Intravenous, Q12H  sodium chloride, 3 mL, Intravenous, Q12H      Continuous Infusions:heparin, 10.8 Units/kg/hr, Last Rate: 13.8 Units/kg/hr (06/07/24 2344)  sodium chloride, 75 mL/hr, Last Rate: 75 mL/hr (06/07/24 2059)      PRN Meds:.  Calcium Replacement - Follow Nurse / BPA Driven Protocol    heparin    heparin    Magnesium Standard Dose Replacement - Follow Nurse / BPA Driven Protocol    nitroglycerin    ondansetron ODT **OR** ondansetron    Phosphorus Replacement - Follow Nurse / BPA Driven Protocol    Potassium Replacement - Follow Nurse / BPA Driven Protocol    sodium chloride    sodium chloride    sodium chloride    sodium chloride    sodium chloride    Objective     VITAL SIGNS  Vitals:    06/07/24 2047 06/07/24 2050 06/08/24 0003 06/08/24 0539   BP: 125/94  123/85 119/83   BP Location: Left arm  Left arm Left arm   Patient Position: Lying  Lying Lying   Pulse: 96  98 97   Resp: 13  17 13   Temp: 98.2 °F (36.8 °C)  99.1 °F (37.3 °C) 99.5 °F (37.5 °C)   TempSrc: Oral  Oral Oral   SpO2: 97%  97% 97%   Weight:  94.1 kg (207 lb 7.3 oz)     Height:           Flowsheet Rows      Flowsheet Row First Filed Value   Admission Height 182.9 cm (72\") Documented at 06/07/2024 1524   Admission Weight 92.5 kg (204 lb) Documented at 06/07/2024 1524              Intake/Output Summary (Last 24 hours) at 6/8/2024 0553  Last data filed at 6/8/2024 0539  Gross per 24 hour   Intake 1240 ml "   Output 900 ml   Net 340 ml        TELEMETRY: Sinus rhythm    Physical Exam:  The patient is alert, oriented and in no distress.  Vital signs as noted above.  Head and neck revealed no carotid bruits or jugular venous distention.  No thyromegaly or lymphadenopathy is present  Lungs clear.  No wheezing.  Breath sounds are normal bilaterally.  Heart normal first and second heart sounds. No murmur.  No precordial rub is present.  No gallop is present.  Abdomen soft and nontender.  No organomegaly is present.  Extremities with good peripheral pulses without any pedal edema.  Skin warm and dry.  Musculoskeletal system is grossly normal  CNS grossly normal    Reviewed and updated.    Results Review:   I reviewed the patient's new clinical results.  Lab Results (last 24 hours)       Procedure Component Value Units Date/Time    Basic Metabolic Panel [[id number]] Collected: 06/08/24 0550    Specimen: Blood from Arm, Left Updated: 06/08/24 0551    Hemoglobin A1c [[id number]]  (Normal) Collected: 06/07/24 2259    Specimen: Blood from Arm, Left Updated: 06/08/24 0000     Hemoglobin A1C 5.42 %     aPTT [[id number]]  (Abnormal) Collected: 06/07/24 2259    Specimen: Blood from Arm, Left Updated: 06/07/24 2330     PTT 34.8 seconds     Protime-INR [[id number]]  (Normal) Collected: 06/07/24 2259    Specimen: Blood from Arm, Left Updated: 06/07/24 2330     Protime 10.6 Seconds      INR 0.97    CBC & Differential [[id number]]  (Abnormal) Collected: 06/07/24 2259    Specimen: Blood from Arm, Left Updated: 06/07/24 2307    Narrative:      The following orders were created for panel order CBC & Differential.  Procedure                               Abnormality         Status                     ---------                               -----------         ------                     CBC Auto Differential[[id number]]        Abnormal            Final result                 Please view results for these tests on the individual orders.    CBC Auto  Differential [[id number]]  (Abnormal) Collected: 06/07/24 2259    Specimen: Blood from Arm, Left Updated: 06/07/24 2307     WBC 10.50 10*3/mm3      RBC 4.04 10*6/mm3      Hemoglobin 13.0 g/dL      Hematocrit 39.1 %      MCV 96.8 fL      MCH 32.2 pg      MCHC 33.2 g/dL      RDW 12.4 %      RDW-SD 44.3 fl      MPV 10.1 fL      Platelets 186 10*3/mm3      Neutrophil % 71.6 %      Lymphocyte % 17.2 %      Monocyte % 10.1 %      Eosinophil % 0.4 %      Basophil % 0.4 %      Immature Grans % 0.3 %      Neutrophils, Absolute 7.52 10*3/mm3      Lymphocytes, Absolute 1.81 10*3/mm3      Monocytes, Absolute 1.06 10*3/mm3      Eosinophils, Absolute 0.04 10*3/mm3      Basophils, Absolute 0.04 10*3/mm3      Immature Grans, Absolute 0.03 10*3/mm3      nRBC 0.0 /100 WBC     High Sensitivity Troponin T [[id number]]  (Abnormal) Collected: 06/07/24 2110    Specimen: Blood from Arm, Left Updated: 06/07/24 2154     HS Troponin T 2,516 ng/L     Narrative:      High Sensitive Troponin T Reference Range:  <14.0 ng/L- Negative Female for AMI  <22.0 ng/L- Negative Male for AMI  >=14 - Abnormal Female indicating possible myocardial injury.  >=22 - Abnormal Male indicating possible myocardial injury.   Clinicians would have to utilize clinical acumen, EKG, Troponin, and serial changes to determine if it is an Acute Myocardial Infarction or myocardial injury due to an underlying chronic condition.         Lipid Panel [[id number]]  (Abnormal) Collected: 06/07/24 2110    Specimen: Blood from Arm, Left Updated: 06/07/24 2153     Total Cholesterol 235 mg/dL      Triglycerides 369 mg/dL      HDL Cholesterol 35 mg/dL      LDL Cholesterol  133 mg/dL      VLDL Cholesterol 67 mg/dL      LDL/HDL Ratio 3.61    Narrative:      Cholesterol Reference Ranges  (U.S. Department of Health and Human Services ATP III Classifications)    Desirable          <200 mg/dL  Borderline High    200-239 mg/dL  High Risk          >240 mg/dL      Triglyceride Reference  Ranges  (U.S. Department of Health and Human Services ATP III Classifications)    Normal           <150 mg/dL  Borderline High  150-199 mg/dL  High             200-499 mg/dL  Very High        >500 mg/dL    HDL Reference Ranges  (U.S. Department of Health and Human Services ATP III Classifications)    Low     <40 mg/dl (major risk factor for CHD)  High    >60 mg/dl ('negative' risk factor for CHD)        LDL Reference Ranges  (U.S. Department of Health and Human Services ATP III Classifications)    Optimal          <100 mg/dL  Near Optimal     100-129 mg/dL  Borderline High  130-159 mg/dL  High             160-189 mg/dL  Very High        >189 mg/dL    TSH [[id number]]  (Normal) Collected: 06/07/24 2110    Specimen: Blood from Arm, Left Updated: 06/07/24 2153     TSH 3.230 uIU/mL     Protime-INR [[id number]]  (Normal) Collected: 06/07/24 2110    Specimen: Blood from Arm, Left Updated: 06/07/24 2149     Protime 10.5 Seconds      INR 0.96    BNP [[id number]]  (Normal) Collected: 06/07/24 1835    Specimen: Blood Updated: 06/07/24 2127     proBNP 665.7 pg/mL     Narrative:      This assay is used as an aid in the diagnosis of individuals suspected of having heart failure. It can be used as an aid in the diagnosis of acute decompensated heart failure (ADHF) in patients presenting with signs and symptoms of ADHF to the emergency department (ED). In addition, NT-proBNP of <300 pg/mL indicates ADHF is not likely.    Age Range Result Interpretation  NT-proBNP Concentration (pg/mL:      <50             Positive            >450                   Gray                 300-450                    Negative             <300    50-75           Positive            >900                  Gray                300-900                  Negative            <300      >75             Positive            >1800                  Gray                300-1800                  Negative            <300    Magnesium [[id number]]  (Normal) Collected:  06/07/24 1835    Specimen: Blood Updated: 06/07/24 2111     Magnesium 2.1 mg/dL     High Sensitivity Troponin T 2Hr [[id number]]  (Abnormal) Collected: 06/07/24 1835    Specimen: Blood Updated: 06/07/24 1856     HS Troponin T 2,156 ng/L      Troponin T Delta 1,494 ng/L     Narrative:      High Sensitive Troponin T Reference Range:  <14.0 ng/L- Negative Female for AMI  <22.0 ng/L- Negative Male for AMI  >=14 - Abnormal Female indicating possible myocardial injury.  >=22 - Abnormal Male indicating possible myocardial injury.   Clinicians would have to utilize clinical acumen, EKG, Troponin, and serial changes to determine if it is an Acute Myocardial Infarction or myocardial injury due to an underlying chronic condition.         aPTT [[id number]]  (Abnormal) Collected: 06/07/24 1533    Specimen: Blood Updated: 06/07/24 1842     PTT 26.5 seconds     POC Protime / INR [[id number]] Collected: 06/07/24 1706    Specimen: Blood from Hand, Left Updated: 06/07/24 1706     Protime 11.0 seconds      INR 1    Comprehensive Metabolic Panel [[id number]]  (Abnormal) Collected: 06/07/24 1551    Specimen: Blood Updated: 06/07/24 1612     Glucose 96 mg/dL      BUN 19 mg/dL      Creatinine 1.02 mg/dL      Sodium 136 mmol/L      Potassium 3.7 mmol/L      Chloride 101 mmol/L      CO2 24.1 mmol/L      Calcium 9.5 mg/dL      Total Protein 6.0 g/dL      Albumin 4.2 g/dL      ALT (SGPT) 32 U/L      AST (SGOT) 80 U/L      Alkaline Phosphatase 101 U/L      Total Bilirubin 0.2 mg/dL      Globulin 1.8 gm/dL      A/G Ratio 2.3 g/dL      BUN/Creatinine Ratio 18.6     Anion Gap 10.9 mmol/L      eGFR 87.3 mL/min/1.73     Narrative:      GFR Normal >60  Chronic Kidney Disease <60  Kidney Failure <15      Lipase [[id number]]  (Abnormal) Collected: 06/07/24 1551    Specimen: Blood Updated: 06/07/24 1612     Lipase 62 U/L     High Sensitivity Troponin T [[id number]]  (Abnormal) Collected: 06/07/24 1551    Specimen: Blood Updated: 06/07/24 1608     HS Troponin  T 662 ng/L     Narrative:      High Sensitive Troponin T Reference Range:  <14.0 ng/L- Negative Female for AMI  <22.0 ng/L- Negative Male for AMI  >=14 - Abnormal Female indicating possible myocardial injury.  >=22 - Abnormal Male indicating possible myocardial injury.   Clinicians would have to utilize clinical acumen, EKG, Troponin, and serial changes to determine if it is an Acute Myocardial Infarction or myocardial injury due to an underlying chronic condition.         [place] Draw [[id number]] Collected: 06/07/24 1533    Specimen: Blood Updated: 06/07/24 1546    Narrative:      The following orders were created for panel order [place] Draw.  Procedure                               Abnormality         Status                     ---------                               -----------         ------                     Green Top (Gel)[[id number]]                                  Final result               Lavender Top[[id number]]                                     Final result               Gold Top - SST[[id number]]                                   Final result               Light Blue Top[[id number]]                                   Final result               Green Top (Gel)[[id number]]                                                               Please view results for these tests on the individual orders.    Green Top (Gel) [[id number]] Collected: 06/07/24 1533    Specimen: Blood Updated: 06/07/24 1546     Extra Tube Hold for add-ons.     Comment: Auto resulted.       Lavender Top [[id number]] Collected: 06/07/24 1533    Specimen: Blood Updated: 06/07/24 1546     Extra Tube hold for add-on     Comment: Auto resulted       Gold Top - SST [[id number]] Collected: 06/07/24 1533    Specimen: Blood Updated: 06/07/24 1546     Extra Tube Hold for add-ons.     Comment: Auto resulted.       Light Blue Top [[id number]] Collected: 06/07/24 1533    Specimen: Blood Updated: 06/07/24 1546     Extra Tube Hold for add-ons.     Comment:  Auto resulted       CBC & Differential [[id number]]  (Abnormal) Collected: 06/07/24 1533    Specimen: Blood Updated: 06/07/24 1538    Narrative:      The following orders were created for panel order CBC & Differential.  Procedure                               Abnormality         Status                     ---------                               -----------         ------                     CBC Auto Differential[[id number]]        Abnormal            Final result                 Please view results for these tests on the individual orders.    CBC Auto Differential [[id number]]  (Abnormal) Collected: 06/07/24 1533    Specimen: Blood Updated: 06/07/24 1538     WBC 10.29 10*3/mm3      RBC 4.40 10*6/mm3      Hemoglobin 14.1 g/dL      Hematocrit 42.9 %      MCV 97.5 fL      MCH 32.0 pg      MCHC 32.9 g/dL      RDW 12.3 %      RDW-SD 44.4 fl      MPV 9.0 fL      Platelets 275 10*3/mm3      Neutrophil % 69.8 %      Lymphocyte % 19.1 %      Monocyte % 9.7 %      Eosinophil % 0.9 %      Basophil % 0.4 %      Immature Grans % 0.1 %      Neutrophils, Absolute 7.18 10*3/mm3      Lymphocytes, Absolute 1.97 10*3/mm3      Monocytes, Absolute 1.00 10*3/mm3      Eosinophils, Absolute 0.09 10*3/mm3      Basophils, Absolute 0.04 10*3/mm3      Immature Grans, Absolute 0.01 10*3/mm3             Imaging Results (Last 24 Hours)       Procedure Component Value Units Date/Time    XR Chest 2 View [[id number]] Collected: 06/07/24 1613     Updated: 06/07/24 1615    Narrative:      XR CHEST 2 VW    Date of Exam: 6/7/2024 4:00 PM EDT    Indication: Chest Pain Triage Protocol    Comparison: None available.    Findings:  No acute airspace disease. Normal heart size. No pleural effusion, pneumothorax or acute osseous abnormality.      Impression:      Impression:  No acute chest finding.      Electronically Signed: Park Escoto MD    6/7/2024 4:13 PM EDT    Workstation ID: [id number]        LAB RESULTS (LAST 7 DAYS)    CBC  Results from last 7 days    Lab Units 06/07/24 2259 06/07/24  1533   WBC 10*3/mm3 10.50 10.29   RBC 10*6/mm3 4.04* 4.40   HEMOGLOBIN g/dL 13.0 14.1   HEMATOCRIT % 39.1 42.9   MCV fL 96.8 97.5*   PLATELETS 10*3/mm3 186 275       BMP  Results from last 7 days   Lab Units 06/07/24  1835 06/07/24  1551   SODIUM mmol/L  --  136   POTASSIUM mmol/L  --  3.7   CHLORIDE mmol/L  --  101   CO2 mmol/L  --  24.1   BUN mg/dL  --  19   CREATININE mg/dL  --  1.02   GLUCOSE mg/dL  --  96   MAGNESIUM mg/dL 2.1  --        CMP   Results from last 7 days   Lab Units 06/07/24  1551   SODIUM mmol/L 136   POTASSIUM mmol/L 3.7   CHLORIDE mmol/L 101   CO2 mmol/L 24.1   BUN mg/dL 19   CREATININE mg/dL 1.02   GLUCOSE mg/dL 96   ALBUMIN g/dL 4.2   BILIRUBIN mg/dL 0.2   ALK PHOS U/L 101   AST (SGOT) U/L 80*   ALT (SGPT) U/L 32   LIPASE U/L 62*         BNP        TROPONIN  Results from last 7 days   Lab Units 06/07/24 2110   HSTROP T ng/L 2,516*       CoAg  Results from last 7 days   Lab Units 06/07/24 2259 06/07/24 2110 06/07/24  1706 06/07/24  1533   INR  0.97 0.96 1  --    APTT seconds 34.8*  --   --  26.5*       Creatinine Clearance  Estimated Creatinine Clearance: 98.6 mL/min (by C-G formula based on SCr of 1.02 mg/dL).    ABG        Radiology  XR Chest 2 View    Result Date: 6/7/2024  Impression: No acute chest finding. Electronically Signed: Park Escoto MD  6/7/2024 4:13 PM EDT  Workstation ID: [id number]       EKG                            I personally viewed and interpreted the patient's EKG/Telemetry data: Sinus rhythm poor R wave progression anteriorly suggestive of anteroseptal infarction age undetermined.  ST-T wave abnormalities-stable.    ECHOCARDIOGRAM:        STRESS TEST        Cardiolite (Tc-99m sestamibi) stress test    HEART CATHETERIZATION  No results found for this or any previous visit.      OTHER:     Assessment & Plan     Principal Problem:    ACS (acute coronary syndrome)    ]]]]]]]]]]]]]]]]]]]]]]]  Impression  ==============  -Unstable  angina  Non-STEMI.  Troponin [phone]    - Dyslipidemia  Cholesterol 235  VLDL 67  Start high-intensity statin.    - History of excessive alcohol consumption.  Patient apparently drinks 6 beers a day.    - History of hand surgery and tonsillectomy.    - Family history of coronary artery disease    - Former smoker.    - No known allergies    =================  Plan  =================  Patient is on intravenous heparin.  Patient to have an echocardiogram to assess left ventricular function..  Unstable angina-pain-free at this time  Non-STEMI  Patient to have cardiac catheterization and coronary arteriography soon possible.  Risks and benefits pros and cons of the procedure including infection bleeding blood clot heart attack stroke allergic reaction to the dye renal dysfunction etc. were discussed.    Start statins  Start beta-blocker    Further plan will depend on patient's progress.  ]]]]]]]]]]]]]]]]]]]]]]]]]    Pierre James MD  06/08/24  05:53 EDT

## 2024-06-08 NOTE — NURSING NOTE
Arrived to room 2124 from cath lab.  Report received at bedside.  Pt has sealed right groin site.  No oozing or bruising noted. Pt verbalized knowledge of keeping leg still.  Fm at bedside.  Pt oriented to room  hob up 30 degrees.  VSS  denies pain/soa  will monitor

## 2024-06-09 ENCOUNTER — READMISSION MANAGEMENT (OUTPATIENT)
Dept: CALL CENTER | Facility: HOSPITAL | Age: 54
End: 2024-06-09
Payer: COMMERCIAL

## 2024-06-09 VITALS
TEMPERATURE: 98.2 F | DIASTOLIC BLOOD PRESSURE: 75 MMHG | SYSTOLIC BLOOD PRESSURE: 116 MMHG | RESPIRATION RATE: 22 BRPM | OXYGEN SATURATION: 98 % | HEIGHT: 72 IN | HEART RATE: 91 BPM | BODY MASS INDEX: 28.67 KG/M2 | WEIGHT: 211.64 LBS

## 2024-06-09 LAB
ANION GAP SERPL CALCULATED.3IONS-SCNC: 9.5 MMOL/L (ref 5–15)
BUN SERPL-MCNC: 11 MG/DL (ref 6–20)
BUN/CREAT SERPL: 11.6 (ref 7–25)
CALCIUM SPEC-SCNC: 8.8 MG/DL (ref 8.6–10.5)
CHLORIDE SERPL-SCNC: 107 MMOL/L (ref 98–107)
CO2 SERPL-SCNC: 20.5 MMOL/L (ref 22–29)
CREAT SERPL-MCNC: 0.95 MG/DL (ref 0.76–1.27)
DEPRECATED RDW RBC AUTO: 46.5 FL (ref 37–54)
EGFRCR SERPLBLD CKD-EPI 2021: 95.1 ML/MIN/1.73
ERYTHROCYTE [DISTWIDTH] IN BLOOD BY AUTOMATED COUNT: 12.6 % (ref 12.3–15.4)
GLUCOSE SERPL-MCNC: 110 MG/DL (ref 65–99)
HCT VFR BLD AUTO: 39.3 % (ref 37.5–51)
HGB BLD-MCNC: 12.7 G/DL (ref 13–17.7)
MCH RBC QN AUTO: 32 PG (ref 26.6–33)
MCHC RBC AUTO-ENTMCNC: 32.3 G/DL (ref 31.5–35.7)
MCV RBC AUTO: 99 FL (ref 79–97)
PLATELET # BLD AUTO: 212 10*3/MM3 (ref 140–450)
PMV BLD AUTO: 9.3 FL (ref 6–12)
POTASSIUM SERPL-SCNC: 4.1 MMOL/L (ref 3.5–5.2)
QT INTERVAL: 320 MS
QT INTERVAL: 405 MS
QTC INTERVAL: 390 MS
QTC INTERVAL: 403 MS
RBC # BLD AUTO: 3.97 10*6/MM3 (ref 4.14–5.8)
SODIUM SERPL-SCNC: 137 MMOL/L (ref 136–145)
WBC NRBC COR # BLD AUTO: 9.78 10*3/MM3 (ref 3.4–10.8)

## 2024-06-09 PROCEDURE — 99232 SBSQ HOSP IP/OBS MODERATE 35: CPT | Performed by: INTERNAL MEDICINE

## 2024-06-09 PROCEDURE — 85027 COMPLETE CBC AUTOMATED: CPT | Performed by: INTERNAL MEDICINE

## 2024-06-09 PROCEDURE — 80048 BASIC METABOLIC PNL TOTAL CA: CPT | Performed by: INTERNAL MEDICINE

## 2024-06-09 RX ORDER — LISINOPRIL 5 MG/1
5 TABLET ORAL
Qty: 30 TABLET | Refills: 0 | Status: SHIPPED | OUTPATIENT
Start: 2024-06-10

## 2024-06-09 RX ORDER — ACETAMINOPHEN 325 MG/1
650 TABLET ORAL EVERY 6 HOURS PRN
COMMUNITY

## 2024-06-09 RX ORDER — ATORVASTATIN CALCIUM 80 MG/1
80 TABLET, FILM COATED ORAL NIGHTLY
Qty: 90 TABLET | Refills: 3 | Status: SHIPPED | OUTPATIENT
Start: 2024-06-09

## 2024-06-09 RX ORDER — ASPIRIN 81 MG/1
81 TABLET ORAL DAILY
Qty: 30 TABLET | Refills: 0 | Status: SHIPPED | OUTPATIENT
Start: 2024-06-10

## 2024-06-09 RX ORDER — METOPROLOL SUCCINATE 25 MG/1
25 TABLET, EXTENDED RELEASE ORAL
Qty: 30 TABLET | Refills: 3 | Status: SHIPPED | OUTPATIENT
Start: 2024-06-10

## 2024-06-09 RX ORDER — NITROGLYCERIN 0.4 MG/1
0.4 TABLET SUBLINGUAL
Qty: 30 TABLET | Refills: 12 | Status: SHIPPED | OUTPATIENT
Start: 2024-06-09

## 2024-06-09 RX ADMIN — METOPROLOL SUCCINATE 25 MG: 25 TABLET, EXTENDED RELEASE ORAL at 08:36

## 2024-06-09 RX ADMIN — ACETAMINOPHEN 650 MG: 325 TABLET, FILM COATED ORAL at 06:03

## 2024-06-09 RX ADMIN — Medication 10 ML: at 08:37

## 2024-06-09 RX ADMIN — ASPIRIN 81 MG: 81 TABLET, COATED ORAL at 08:35

## 2024-06-09 RX ADMIN — TICAGRELOR 90 MG: 90 TABLET ORAL at 08:36

## 2024-06-09 NOTE — OUTREACH NOTE
Prep Survey      Flowsheet Row Responses   [facility] patient discharged from? [facility]   Is LACE score < 7 ? Yes   Eligibility [facility]   Date of Admission 06/07/24   Date of Discharge 06/09/24   Discharge Disposition Home or Self Care   Discharge diagnosis ACS (acute coronary syndrome)   Does the patient have one of the following disease processes/diagnoses(primary or secondary)? Acute MI (STEMI,NSTEMI)   Does the patient have Home health ordered? No   Is there a DME ordered? No   Medication alerts for this patient see AVS   Prep survey completed? Yes            Kari DEL CID - Registered Nurse

## 2024-06-09 NOTE — NURSING NOTE
Bedside shift report given to patient's on coming RN Michael. Patient's right groin heart cath site assessed during bedside shift report, site remains soft and slightly tender, free of signs and symptoms of hematoma. Patient has no current complaints and verbalized his headache he had previously went away. RLE remains neurovascularly intact. Call light in reach and wife at bedside.

## 2024-06-09 NOTE — PROGRESS NOTES
Patient ID: Enedina Hopkins is a 49 y.o. female who is being seen today for   Chief Complaint   Patient presents with    Follow-up     sleep     Referring: LUIS Calles    HPI:   Enedina Hopkins is a 49 y.o. female for televideo appointment via video and audio virtual visit for LINDSAY follow up.  States she is doing well with CPAP.  States her energy level is great.  Patient is using CPAP   6-8 hrs/night. Using humidifier. No snoring on CPAP. The pressure is well tolerated. The mask is comfortable-full face. No mask leak. No significant daytime sleepiness. No nodding off when driving. No dry nose or throat. No fatigue. Bedtime is 10-11 pm and rise time is 6-630 am. Sleep onset is 30 minutes. Wakes up 1-2 times at night total. 0-1 nocturia. It takes few minutes to fall back a sleep. Rare naps during the day. No headache in am. No weight gain. 1 caffienated beverages during the day. No alcohol. ESS is 2          1/26/2024     8:18 AM 12/16/2022     8:48 AM 12/16/2022     8:32 AM 6/10/2022     9:33 AM   Sleep Medicine   Sitting and reading 0 1 1 2   Watching TV 1 0 0 2   Sitting, inactive in a public place (e.g. a theatre or a meeting) 0 0 0 2   As a passenger in a car for an hour without a break 0 1 1 2   Lying down to rest in the afternoon when circumstances permit 1 1 1 3   Sitting and talking to someone 0 0 0 0   Sitting quietly after a lunch without alcohol 0 1 1 3   In a car, while stopped for a few minutes in traffic 0 0 1 1   [place] Sleepiness Score 2 4 5 15       Past Medical History:  Past Medical History:   Diagnosis Date    CAD (coronary artery disease)     Depression     Hyperlipidemia     Hypertension        Past Surgical History:        Procedure Laterality Date    CARPAL TUNNEL RELEASE Left 5/8/2023    LEFT CARPAL TUNNEL RELEASE performed by Christophe Orosco [place] [facility] [place]    CARPAL TUNNEL RELEASE Right 5/26/2023    RIGHT CARPAL TUNNEL RELEASE performed by Christophe Orosco MD at [facility] [place]  Referring Provider: Salinas Santiago MD    Reason for follow-up:  Unstable angina  Non-STEMI  Status post stent to LAD 2024     Patient Care Team:  Mikey Keller MD as PCP - General (Internal Medicine)    Subjective .      ROS    Today, the patient has been without any chest discomfort ,shortness of breath, palpitations, dizziness or syncope.  Denies having any headache ,abdominal pain ,nausea, vomiting , diarrhea constipation, loss of weight or loss of appetite.  Denies having any excessive bruising ,hematuria or blood in the stool.    Review of all systems negative except as indicated    History  History reviewed. No pertinent past medical history.    Past Surgical History:   Procedure Laterality Date    HAND SURGERY      TONSILLECTOMY         Family History   Problem Relation Age of Onset    No Known Problems Mother     No Known Problems Father        Social History     Tobacco Use    Smoking status: Former     Current packs/day: 0.00     Average packs/day: 0.5 packs/day for 2.0 years (1.0 ttl pk-yrs)     Types: Cigarettes     Start date: 1989     Quit date: 1991     Years since quittin.6    Smokeless tobacco: Former     Types: Chew     Quit date:    Vaping Use    Vaping status: Never Used   Substance Use Topics    Alcohol use: Yes     Alcohol/week: 6.0 standard drinks of alcohol     Types: 6 Cans of beer per week    Drug use: Never        Medications Prior to Admission   Medication Sig Dispense Refill Last Dose    amoxicillin-clavulanate (AUGMENTIN) 875-125 MG per tablet Take 1 tablet by mouth 2 (Two) Times a Day. 14 tablet 0        Allergies  Patient has no known allergies.    Scheduled Meds:aspirin, 81 mg, Oral, Daily  atorvastatin, 80 mg, Oral, Nightly  atropine, 0.5 mg, Intravenous, Once  lisinopril, 5 mg, Oral, Q24H  metoprolol succinate XL, 25 mg, Oral, Q24H  sodium chloride, 10 mL, Intravenous, Q12H  ticagrelor, 90 mg, Oral, BID      Continuous Infusions:   PRN Meds:.   "acetaminophen    Calcium Replacement - Follow Nurse / BPA Driven Protocol    diphenhydrAMINE    Magnesium Standard Dose Replacement - Follow Nurse / BPA Driven Protocol    nitroglycerin    ondansetron ODT **OR** ondansetron    Phosphorus Replacement - Follow Nurse / BPA Driven Protocol    Potassium Replacement - Follow Nurse / BPA Driven Protocol    sodium chloride    sodium chloride    sodium chloride    Objective     VITAL SIGNS  Vitals:    06/08/24 1713 06/08/24 2045 06/09/24 0130 06/09/24 0517   BP: 115/81 108/74 96/68 105/67   BP Location: Right arm Right arm Right arm Right arm   Patient Position: Lying Lying Lying Lying   Pulse: 95 103 81 90   Resp: 16 18 12 14   Temp: 98.6 °F (37 °C) 98.3 °F (36.8 °C) 98 °F (36.7 °C) 97.9 °F (36.6 °C)   TempSrc: Oral Oral Oral Oral   SpO2: 98% 97% 96% 95%   Weight:    96 kg (211 lb 10.3 oz)   Height:           Flowsheet Rows      Flowsheet Row First Filed Value   Admission Height 182.9 cm (72\") Documented at 06/07/2024 1524   Admission Weight 92.5 kg (204 lb) Documented at 06/07/2024 1524              Intake/Output Summary (Last 24 hours) at 6/9/2024 0739  Last data filed at 6/8/2024 2130  Gross per 24 hour   Intake --   Output 950 ml   Net -950 ml        TELEMETRY: Sinus rhythm    Physical Exam:  The patient is alert, oriented and in no distress.  Vital signs as noted above.  Head and neck revealed no carotid bruits or jugular venous distention.  No thyromegaly or lymphadenopathy is present  Lungs clear.  No wheezing.  Breath sounds are normal bilaterally.  Heart normal first and second heart sounds.  No murmur. No precordial rub is present.  No gallop is present.  Abdomen soft and nontender.  No organomegaly is present.  Extremities with good peripheral pulses without any pedal edema.  Cardiac cath site looks normal.  Skin warm and dry.  Musculoskeletal system is grossly normal  CNS grossly normal    Reviewed and updated.  Results Review:   I reviewed the patient's new " clinical results.  Lab Results (last 24 hours)       Procedure Component Value Units Date/Time    Basic Metabolic Panel [[id number]]  (Abnormal) Collected: 06/09/24 0600    Specimen: Blood Updated: 06/09/24 0628     Glucose 110 mg/dL      BUN 11 mg/dL      Creatinine 0.95 mg/dL      Sodium 137 mmol/L      Potassium 4.1 mmol/L      Comment: Specimen hemolyzed.  Result may be falsely elevated.        Chloride 107 mmol/L      CO2 20.5 mmol/L      Calcium 8.8 mg/dL      BUN/Creatinine Ratio 11.6     Anion Gap 9.5 mmol/L      eGFR 95.1 mL/min/1.73     Narrative:      GFR Normal >60  Chronic Kidney Disease <60  Kidney Failure <15      CBC (No Diff) [[id number]]  (Abnormal) Collected: 06/09/24 0600    Specimen: Blood Updated: 06/09/24 0605     WBC 9.78 10*3/mm3      RBC 3.97 10*6/mm3      Hemoglobin 12.7 g/dL      Hematocrit 39.3 %      MCV 99.0 fL      MCH 32.0 pg      MCHC 32.3 g/dL      RDW 12.6 %      RDW-SD 46.5 fl      MPV 9.3 fL      Platelets 212 10*3/mm3     POC Activated Clotting Time [[id number]]  (Abnormal) Collected: 06/08/24 1044    Specimen: Arterial Blood Updated: 06/08/24 1053     Activated Clotting Time  250 Seconds      Comment: Serial Number: [id number]:  [id number]       POC Activated Clotting Time [[id number]]  (Abnormal) Collected: 06/08/24 1032    Specimen: Arterial Blood Updated: 06/08/24 1053     Activated Clotting Time  256 Seconds      Comment: Serial Number: [id number]:  [id number]               Imaging Results (Last 24 Hours)       ** No results found for the last 24 hours. **        LAB RESULTS (LAST 7 DAYS)    CBC  Results from last 7 days   Lab Units 06/09/24  0600 06/07/24  2259 06/07/24  1533   WBC 10*3/mm3 9.78 10.50 10.29   RBC 10*6/mm3 3.97* 4.04* 4.40   HEMOGLOBIN g/dL 12.7* 13.0 14.1   HEMATOCRIT % 39.3 39.1 42.9   MCV fL 99.0* 96.8 97.5*   PLATELETS 10*3/mm3 212 186 275       BMP  Results from last 7 days   Lab Units 06/09/24  0600 06/08/24  0550 06/07/24  1835 06/07/24  1551   SODIUM  mmol/L 137 137  --  136   POTASSIUM mmol/L 4.1 3.9  --  3.7   CHLORIDE mmol/L 107 105  --  101   CO2 mmol/L 20.5* 23.1  --  24.1   BUN mg/dL 11 12  --  19   CREATININE mg/dL 0.95 0.86  --  1.02   GLUCOSE mg/dL 110* 113*  --  96   MAGNESIUM mg/dL  --   --  2.1  --        CMP   Results from last 7 days   Lab Units 06/09/24  0600 06/08/24  0550 06/07/24  1551   SODIUM mmol/L 137 137 136   POTASSIUM mmol/L 4.1 3.9 3.7   CHLORIDE mmol/L 107 105 101   CO2 mmol/L 20.5* 23.1 24.1   BUN mg/dL 11 12 19   CREATININE mg/dL 0.95 0.86 1.02   GLUCOSE mg/dL 110* 113* 96   ALBUMIN g/dL  --   --  4.2   BILIRUBIN mg/dL  --   --  0.2   ALK PHOS U/L  --   --  101   AST (SGOT) U/L  --   --  80*   ALT (SGPT) U/L  --   --  32   LIPASE U/L  --   --  62*         BNP        TROPONIN  Results from last 7 days   Lab Units 06/07/24  2110   HSTROP T ng/L 2,516*       CoAg  Results from last 7 days   Lab Units 06/08/24  0550 06/07/24  2259 06/07/24  2110 06/07/24  1706 06/07/24  1533   INR   --  0.97 0.96 1  --    APTT seconds 94.2* 34.8*  --   --  26.5*       Creatinine Clearance  Estimated Creatinine Clearance: 106.9 mL/min (by C-G formula based on SCr of 0.95 mg/dL).    ABG        Radiology  Adult Transthoracic Echo Complete W/ Cont if Necessary Per Protocol    Addendum Date: 6/8/2024      Left ventricular ejection fraction appears to be 46 - 50%. Indication Chest pain Technically satisfactory study. Mitral valve is structurally normal.  Mild mitral regurgitation is present. Tricuspid valve is structurally normal. Aortic valve is structurally normal. Pulmonic valve could not be well visualized. No evidence for tricuspid or aortic regurgitation is seen by Doppler study. Left atrium is normal in size. Right atrium is normal in size. Left ventricle is normal in size and with apical and periapical severe hypokinesis with estimated left ventricle ejection fraction of 40-45 %. Possible small left ventricular apical thrombus. Left ventricular  diastolic function is normal. Left ventricular peak systolic longitudinal strain is abnormal with GL PS of -9%. Right ventricle is normal in size and contractility with TAPSE of 2.7 cm.. Atrial septum is intact. Aorta is normal. No pericardial effusion or intracardiac thrombus is seen. Impression Structurally and functionally normal cardiac valves except for mild mitral regurgitation.. Left ventricle size is normal with apical and periapical severe hypokinesis with estimated left ventricular ejection fraction of 40-45 %.  Possible small apical thrombus. Normal left ventricular diastolic function. Left ventricular peak systolic longitudinal strain is abnormal with GL PS of -9%.    XR Chest 2 View    Result Date: 6/7/2024  Impression: No acute chest finding. Electronically Signed: Park Escoto MD  6/7/2024 4:13 PM EDT  Workstation ID: [id number]         EKG                                            I personally viewed and interpreted the patient's EKG/Telemetry data: Sinus rhythm poor R wave progression anteriorly.    ECHOCARDIOGRAM:    Results for orders placed during the hospital encounter of 06/07/24    Adult Transthoracic Echo Complete W/ Cont if Necessary Per Protocol    Interpretation Summary    Left ventricular ejection fraction appears to be 46 - 50%.    Indication  Chest pain    Technically satisfactory study.  Mitral valve is structurally normal.  Mild mitral regurgitation is present.  Tricuspid valve is structurally normal.  Aortic valve is structurally normal.  Pulmonic valve could not be well visualized.  No evidence for tricuspid or aortic regurgitation is seen by Doppler study.  Left atrium is normal in size.  Right atrium is normal in size.  Left ventricle is normal in size and with apical and periapical severe hypokinesis with estimated left ventricle ejection fraction of 40-45 %.  Possible small left ventricular apical thrombus.  Left ventricular diastolic function is normal.  Left ventricular peak  systolic longitudinal strain is abnormal with GL PS of -9%.  Right ventricle is normal in size and contractility with TAPSE of 2.7 cm..  Atrial septum is intact.  Aorta is normal.  No pericardial effusion or intracardiac thrombus is seen.    Impression  Structurally and functionally normal cardiac valves except for mild mitral regurgitation..  Left ventricle size is normal with apical and periapical severe hypokinesis with estimated left ventricular ejection fraction of 40-45 %.  Possible small apical thrombus.  Normal left ventricular diastolic function.  Left ventricular peak systolic longitudinal strain is abnormal with GL PS of -9%.          STRESS TEST        Cardiolite (Tc-99m sestamibi) stress test    CARDIAC CATHETERIZATION  Results for orders placed during the hospital encounter of 06/07/24    Cardiac Catheterization/Vascular Study    Conclusion  Diagnostic cardiac catheterization was performed by Dr. James which is dictated separately.    I performed the following procedures:    #1 PCI with LC placement to LAD for acute MI 91246  #2 intravascular ultrasound of LAD 38564  #3 Common femoral angiogram and Angio-Seal deployment    Procedure in details  5 [place] introducer sheath was exchanged with 10 cm 6 [place] introducer sheath.  100 units/kg of heparin was administered and ACT of more than 250 was documented.  A 6 [place]e XB LAD 3.5 guide catheter was used to engage the ostium of left main coronary artery.  A 0.014 run-through wire was then advanced into the distal/apical LAD.  I then used a 2.5 x 12 mm compliant balloon to perform predilatation of this lesion.  Pre-PCI lesion description: 99% thrombotic mid LAD occlusion with ELIZABETH II flow.  Lesion length~20 mm.  Type C due to involvement of 2 diagonal arteries.  I then advanced intravascular ultrasound catheter onto the run-through wire into the mid LAD and performed a slow manual pullback.  Mid LAD measured 2.7 x 3.1 mm in dimension and proximal LAD  measured 3.7 x 4 mm in dimension.  Thrombus was confirmed at the site of lesion and proximal LAD also had mild atherosclerotic plaque.  I then placed a 3 x 28 mm Xience luis point stent which was deployed at 14 crystal.  This was postdilated with 3.5 X 15 mm noncompliant balloon at 14 crystal.  Final angiography showed ELIZABETH-3 flow in both the diagonals and LAD with 0% residual stenosis.    Conclusion  Successful IVUS guided PCI for acute MI LAD with LC placement    Recommendations  Start dual antiplatelet therapy  Start high intensity statin, beta-blocker and ACE inhibitor  Cardiac rehab referral.    Electronically signed by Sher Solorzano MD, 06/08/24, 11:03 AM EDT.                OTHER:         Assessment & Plan     Principal Problem:    ACS (acute coronary syndrome)  Active Problems:    Acute transmural myocardial infarct anterior wall, initial hospitaliz    Acute coronary syndrome with high troponin    Alcohol-induced acute pancreatitis    Alcohol abuse    Hypercholesterolemia      ]]]]]]]]]]]]]]]]]]]]]]]  Impression  ==============  -Unstable angina  Non-STEMI.  Troponin [phone]    Cardiac catheterization 6/8/2024.  Left ventricular angiogram revealed left ventricular apical hypokinesis with ejection fraction of 50 to 55%.  1-2 placed mitral regurgitation is present.     Left main coronary artery is normal.  Left anterior descending artery has 99% disease with probable clot between the first and second diagonal branches.  Circumflex coronary artery is a large and codominant vessel and is normal.  Right coronary artery is a codominant vessel and is normal.      Echocardiogram 6/8/2024.  Structurally and functionally normal cardiac valves except for mild mitral regurgitation..  Left ventricle size is normal with apical and periapical severe hypokinesis with estimated left ventricular ejection fraction of 40-45 %.  Possible small apical thrombus.  Normal left ventricular diastolic function.  Left ventricular peak  systolic longitudinal strain is abnormal with GL PS of -9%.     - Dyslipidemia  Cholesterol 235  VLDL 67  Start high-intensity statin.     - History of excessive alcohol consumption.  Patient apparently drinks 6 beers a day.     - History of hand surgery and tonsillectomy.     - Family history of coronary artery disease     - Former smoker.     - No known allergies     =================  Plan  =================  Unstable angina/non-STEMI.  Cardiac catheterization 6/8/2024-as above  Status post stent to LAD 6/8/2024-as above    Modification of risk factors.  Hopefully left ventricle function would improve with interventional revascularization.    Patient had vasovagal reaction yesterday and improved quickly with fluids and IV atropine.    Patient is not having any angina pectoris or congestive heart failure.    Dyslipidemia-on statins.    Significant dyslipidemia-on statin.    Medications were reviewed and updated.    Current medications include aspirin atorvastatin 80 mg p.o. nightly lisinopril 5 mg a day metoprolol XL 25 mg a day Brilinta 90 mg twice daily.    Patient and patient's family were explained about importance of taking Brilinta for at least 1 year without interruption.    Follow-up in the office in 2 to 3 weeks.    Further plan will depend on patient's progress.    Reviewed and updated 6/9/2024.  ]]]]]]]]]]]]]]]]]]]]]]]]]                 Pierre James MD  06/09/24  07:39 EDT

## 2024-06-09 NOTE — PLAN OF CARE
Problem: Adult Inpatient Plan of Care  Goal: Plan of Care Review  Outcome: Met  Flowsheets (Taken 6/9/2024 1215)  Outcome Evaluation: Pt to discharge home.  IV removed intact.  verbalizes understanding of d/c orders. Right groin cath site with small bruised area but no bleeeding or hematoma noted.  area soft.  Pt to d/c home with wife   Goal Outcome Evaluation:              Outcome Evaluation: Pt to discharge home.  IV removed intact.  verbalizes understanding of d/c orders. Right groin cath site with small bruised area but no bleeeding or hematoma noted.  area soft.  Pt to d/c home with wife

## 2024-06-09 NOTE — DISCHARGE SUMMARY
[facility] Medicine Services  Discharge Summary    Date of Service: 24  Patient Name: Logan Huitron  : 1970  MRN: [id number]    Date of Admission: 2024  Discharge Diagnosis: #ACS with an NSTEMI  Date of Discharge:  24  Primary Care Physician: Mikey Keller MD      Presenting Problem:   Alcohol abuse [F10.10]  Hypercholesterolemia [E78.00]  ACS (acute coronary syndrome) [I24.9]  Acute transmural myocardial infarct anterior wall, initial hospitaliz [I21.09]  Alcohol-induced acute pancreatitis, unspecified complication status [K85.20]  Acute coronary syndrome with high troponin [I24.9]    Active and Resolved Hospital Problems:  Active Hospital Problems    Diagnosis POA    **ACS (acute coronary syndrome) [I24.9] Yes    Acute transmural myocardial infarct anterior wall, initial hospitaliz [I21.09] Unknown    Acute coronary syndrome with high troponin [I24.9] Unknown    Alcohol-induced acute pancreatitis [K85.20] Unknown    Alcohol abuse [F10.10] Unknown    Hypercholesterolemia [E78.00] Unknown      Resolved Hospital Problems   No resolved problems to display.       #ACS with an NSTEMI  Presented with chest pain  - Elevated troponin trending up  - EKG NSR with slight ST-T wave changes on V2-V5  - Started on aspirin statin on heparin drip monitor for toxicity  - Cardiology team consulted for cardiac cath s/p LC   LV angiogram left ventricular hypokinesis with EF of 50-55% status post PCI showing acute MI LAD with LC placement.  Recommended DAPT, high intensity statin, beta-blocker and ACE inhibitor as well as Cardiografin follow-up and discharged  -echoCardiogram  F 46-50% mild MR  -Cardiac rehab on discharge     #Hyperlipidemia  - Started on statin       Hospital Course     HPI: Logan Huitron is a 53 y.o. male who presented to  for chest pain.  Admits to episodic 4/10 chest pain ongoing for the past two days that suddenly worsened while he was playing golf today.  Jazmine  numbness, chills, fevers, dyspnea, vomiting, diarrhea.  Presented to  for evaluation.       ED course: At , labs 98F, HR 84, /90, RR 16, 98% RA.  Labs notable for HS troponin 662, AST 80, lipase 62.  EKG NSR with slight ST changes in V2-V5. CXR without acute cardiopulmonary process.  Cardiology contacted, patient started on heparin drip. Transferred to [facility] for cardiac evaluation.     Patient was seen and examined on 06/07/24 at 21:04 EDT .  Patient underwent Cardiac catheterization 6/8/2024.  Left ventricular angiogram revealed left ventricular apical hypokinesis with ejection fraction of 50 to 55%.  1-2 placed mitral regurgitation is present.  Left main coronary artery is normal.  Left anterior descending artery has 99% disease with probable clot between the first and second diagonal branches.  Circumflex coronary artery is a large and codominant vessel and is normal.  Right coronary artery is a codominant vessel and is normal.      Echocardiogram 6/8/2024.  Structurally and functionally normal cardiac valves except for mild mitral regurgitation..  Left ventricle size is normal with apical and periapical severe hypokinesis with estimated left ventricular ejection fraction of 40-45 %.  Possible small apical thrombus.  Normal left ventricular diastolic function.  Left ventricular peak systolic longitudinal strain is abnormal with GL PS of -9%    6/9/24 doing better today, will dc home, condition on dc stable.    DISCHARGE Follow Up Recommendations for labs and diagnostics: follow with pcp in one week      Reasons For Change In Medications and Indications for New Medications:  START taking:  aspirin   Start taking on: Helen 10, 2024  atorvastatin (LIPITOR)   lisinopril (PRINIVIL,ZESTRIL)   Start taking on: Helen 10, 2024  metoprolol succinate XL (TOPROL-XL)   Start taking on: Helen 10, 2024  nitroglycerin (NITROSTAT)   ticagrelor (BRILINTA)     Day of Discharge     Vital Signs:  Temp:  [97.9 °F (36.6 °C)-98.6  °F (37 °C)] 98.2 °F (36.8 °C)  Heart Rate:  [] 91  Resp:  [12-22] 22  BP: ()/() 116/75    Physical Exam:  Physical Exam      Mouth: Mucous membranes are moist.   Eyes:      Pupils: Pupils are equal, round, and reactive to light.   Cardiovascular:      Pulses: Normal pulses.      Heart sounds: Normal heart sounds.   Pulmonary:      Breath sounds: Normal breath sounds.   Abdominal:      General: Abdomen is flat.      Palpations: Abdomen is soft.   Musculoskeletal:         General: Normal range of motion.      Cervical back: Neck supple.   Skin:     Comments: Rt groin dressing with minimal bleeding    Neurological:      General: No focal deficit present.      Mental Status: He is alert.   Psychiatric:         Mood and Affect: Mood normal.          Pertinent  and/or Most Recent Results     LAB RESULTS:      Lab 06/09/24 0600 06/08/24  0550 06/07/24 2259 06/07/24 2110 06/07/24  1706 06/07/24  1533   WBC 9.78  --  10.50  --   --  10.29   HEMOGLOBIN 12.7*  --  13.0  --   --  14.1   HEMATOCRIT 39.3  --  39.1  --   --  42.9   PLATELETS 212  --  186  --   --  275   NEUTROS ABS  --   --  7.52*  --   --  7.18*   IMMATURE GRANS (ABS)  --   --  0.03  --   --  0.01   LYMPHS ABS  --   --  1.81  --   --  1.97   MONOS ABS  --   --  1.06*  --   --  1.00*   EOS ABS  --   --  0.04  --   --  0.09   MCV 99.0*  --  96.8  --   --  97.5*   PROTIME  --   --  10.6 10.5 11.0  --    APTT  --  94.2* 34.8*  --   --  26.5*         Lab 06/09/24  0600 06/08/24  0550 06/07/24 2259 06/07/24 2110 06/07/24  1835 06/07/24  1551   SODIUM 137 137  --   --   --  136   POTASSIUM 4.1 3.9  --   --   --  3.7   CHLORIDE 107 105  --   --   --  101   CO2 20.5* 23.1  --   --   --  24.1   ANION GAP 9.5 8.9  --   --   --  10.9   BUN 11 12  --   --   --  19   CREATININE 0.95 0.86  --   --   --  1.02   EGFR 95.1 102.9  --   --   --  87.3   GLUCOSE 110* 113*  --   --   --  96   CALCIUM 8.8 8.7  --   --   --  9.5   MAGNESIUM  --   --   --   --  2.1   --    HEMOGLOBIN A1C  --   --  5.42  --   --   --    TSH  --   --   --  3.230  --   --          Lab 06/07/24  1551   TOTAL PROTEIN 6.0   ALBUMIN 4.2   GLOBULIN 1.8   ALT (SGPT) 32   AST (SGOT) 80*   BILIRUBIN 0.2   ALK PHOS 101   LIPASE 62*         Lab 06/07/24  2259 06/07/24  2110 06/07/24  1835 06/07/24  1706 06/07/24  1551   PROBNP  --   --  665.7  --   --    HSTROP T  --  2,516* 2,156*  --  662*   PROTIME 10.6 10.5  --  11.0  --    INR 0.97 0.96  --  1  --          Lab 06/07/24  2110   CHOLESTEROL 235*   LDL CHOL 133*   HDL CHOL 35*   TRIGLYCERIDES 369*             Brief Urine Lab Results       None          Microbiology Results (last 10 days)       ** No results found for the last 240 hours. **            Adult Transthoracic Echo Complete W/ Cont if Necessary Per Protocol    Addendum Date: 6/8/2024      Left ventricular ejection fraction appears to be 46 - 50%. Indication Chest pain Technically satisfactory study. Mitral valve is structurally normal.  Mild mitral regurgitation is present. Tricuspid valve is structurally normal. Aortic valve is structurally normal. Pulmonic valve could not be well visualized. No evidence for tricuspid or aortic regurgitation is seen by Doppler study. Left atrium is normal in size. Right atrium is normal in size. Left ventricle is normal in size and with apical and periapical severe hypokinesis with estimated left ventricle ejection fraction of 40-45 %. Possible small left ventricular apical thrombus. Left ventricular diastolic function is normal. Left ventricular peak systolic longitudinal strain is abnormal with GL PS of -9%. Right ventricle is normal in size and contractility with TAPSE of 2.7 cm.. Atrial septum is intact. Aorta is normal. No pericardial effusion or intracardiac thrombus is seen. Impression Structurally and functionally normal cardiac valves except for mild mitral regurgitation.. Left ventricle size is normal with apical and periapical severe hypokinesis with  estimated left ventricular ejection fraction of 40-45 %.  Possible small apical thrombus. Normal left ventricular diastolic function. Left ventricular peak systolic longitudinal strain is abnormal with GL PS of -9%.    XR Chest 2 View    Result Date: 6/7/2024  Impression: Impression: No acute chest finding. Electronically Signed: Park Escoto MD  6/7/2024 4:13 PM EDT  Workstation ID: [id number]             Results for orders placed during the hospital encounter of 06/07/24    Adult Transthoracic Echo Complete W/ Cont if Necessary Per Protocol    Interpretation Summary    Left ventricular ejection fraction appears to be 46 - 50%.    Indication  Chest pain    Technically satisfactory study.  Mitral valve is structurally normal.  Mild mitral regurgitation is present.  Tricuspid valve is structurally normal.  Aortic valve is structurally normal.  Pulmonic valve could not be well visualized.  No evidence for tricuspid or aortic regurgitation is seen by Doppler study.  Left atrium is normal in size.  Right atrium is normal in size.  Left ventricle is normal in size and with apical and periapical severe hypokinesis with estimated left ventricle ejection fraction of 40-45 %.  Possible small left ventricular apical thrombus.  Left ventricular diastolic function is normal.  Left ventricular peak systolic longitudinal strain is abnormal with GL PS of -9%.  Right ventricle is normal in size and contractility with TAPSE of 2.7 cm..  Atrial septum is intact.  Aorta is normal.  No pericardial effusion or intracardiac thrombus is seen.    Impression  Structurally and functionally normal cardiac valves except for mild mitral regurgitation..  Left ventricle size is normal with apical and periapical severe hypokinesis with estimated left ventricular ejection fraction of 40-45 %.  Possible small apical thrombus.  Normal left ventricular diastolic function.  Left ventricular peak systolic longitudinal strain is abnormal with GL PS of  -9%.      Labs Pending at Discharge:      Procedures Performed  Procedure(s):  Left Heart Cath and coronary angiogram  Percutaneous Coronary Intervention  Intravascular Ultrasound         Consults:   Consults       Date and Time Order Name Status Description    6/7/2024  8:50 PM Inpatient Cardiology Consult            ssessment & Plan  Principal Problem:    ACS (acute coronary syndrome)  Active Problems:    Acute transmural myocardial infarct anterior wall, initial hospitaliz    Acute coronary syndrome with high troponin    Alcohol-induced acute pancreatitis    Alcohol abuse    Hypercholesterolemia        ]]]]]]]]]]]]]]]]]]]]]]]  Impression  ==============  -Unstable angina  Non-STEMI.  Troponin 662 2156 2516     Cardiac catheterization 6/8/2024.  Left ventricular angiogram revealed left ventricular apical hypokinesis with ejection fraction of 50 to 55%.  1-2 placed mitral regurgitation is present.     Left main coronary artery is normal.  Left anterior descending artery has 99% disease with probable clot between the first and second diagonal branches.  Circumflex coronary artery is a large and codominant vessel and is normal.  Right coronary artery is a codominant vessel and is normal.      Echocardiogram 6/8/2024.  Structurally and functionally normal cardiac valves except for mild mitral regurgitation..  Left ventricle size is normal with apical and periapical severe hypokinesis with estimated left ventricular ejection fraction of 40-45 %.  Possible small apical thrombus.  Normal left ventricular diastolic function.  Left ventricular peak systolic longitudinal strain is abnormal with GL PS of -9%.     - Dyslipidemia  Cholesterol 235  VLDL 67  Start high-intensity statin.     - History of excessive alcohol consumption.  Patient apparently drinks 6 beers a day.     - History of hand surgery and tonsillectomy.     - Family history of coronary artery disease     - Former smoker.     - No known allergies      =================  Plan  =================  Unstable angina/non-STEMI.  Cardiac catheterization 6/8/2024-as above  Status post stent to LAD 6/8/2024-as above     Modification of risk factors.  Hopefully left ventricle function would improve with interventional revascularization.     Patient had vasovagal reaction yesterday and improved quickly with fluids and IV atropine.     Patient is not having any angina pectoris or congestive heart failure.     Dyslipidemia-on statins.     Significant dyslipidemia-on statin.     Medications were reviewed and updated.     Current medications include aspirin atorvastatin 80 mg p.o. nightly lisinopril 5 mg a day metoprolol XL 25 mg a day Brilinta 90 mg twice daily.     Patient and patient's family were explained about importance of taking Brilinta for at least 1 year without interruption.     Follow-up in the office in 2 to 3 weeks.     Further plan will depend on patient's progress.     Reviewed and updated 6/9/2024.  ]]]]]]]]]]]]]]]]]]]]]]]]]                    Pierre James MD  06/09/24    Discharge Details        Discharge Medications        New Medications        Instructions Start Date   aspirin 81 MG EC tablet   81 mg, Oral, Daily   Start Date: Helen 10, 2024     atorvastatin 80 MG tablet  Commonly known as: LIPITOR   80 mg, Oral, Nightly      lisinopril 5 MG tablet  Commonly known as: PRINIVIL,ZESTRIL   5 mg, Oral, Every 24 Hours Scheduled   Start Date: Helen 10, 2024     metoprolol succinate XL 25 MG 24 hr tablet  Commonly known as: TOPROL-XL   25 mg, Oral, Every 24 Hours Scheduled   Start Date: Helen 10, 2024     nitroglycerin 0.4 MG SL tablet  Commonly known as: NITROSTAT   0.4 mg, Sublingual, Every 5 Minutes PRN, Take no more than 3 doses in 15 minutes.      ticagrelor 90 MG tablet tablet  Commonly known as: BRILINTA   90 mg, Oral, 2 Times Daily             Continue These Medications        Instructions Start Date   acetaminophen 325 MG tablet  Commonly known as:  TYLENOL   650 mg, Oral, Every 6 Hours PRN               No Known Allergies      Discharge Disposition:   Home or Self Care    Diet:  Hospital:  Diet Order   Procedures    Diet: Cardiac; Healthy Heart (2-3 Na+); Fluid Consistency: Thin (IDDSI 0)         Discharge Activity:   Activity Instructions       Gradually Increase Activity Until at Pre-Hospitalization Level                CODE STATUS:  Code Status and Medical Interventions:   Ordered at: 06/07/24 2049     Code Status (Patient has no pulse and is not breathing):    CPR (Attempt to Resuscitate)     Medical Interventions (Patient has pulse or is breathing):    Full Support         Future Appointments   Date Time Provider Department Center   12/18/2024  8:15 AM Mikey Keller MD [facility]       Additional Instructions for the Follow-ups that You Need to Schedule       Discharge Follow-up with PCP   As directed       Currently Documented PCP:    Mikey Keller MD    PCP Phone Number:    [phone]     Follow Up Details: 1 week        Discharge Follow-up with Specified Provider: cardiology; 2 Weeks   As directed      To: cardiology   Follow Up: 2 Weeks                Time spent on Discharge including face to face service:  >30 minutes    Signature: Electronically signed by Salinas Santiago MD, 06/09/24, 12:04 EDT.  [facility] Hospitalist Team

## 2024-06-10 ENCOUNTER — TELEPHONE (OUTPATIENT)
Dept: CARDIOLOGY | Facility: CLINIC | Age: 54
End: 2024-06-10
Payer: COMMERCIAL

## 2024-06-10 ENCOUNTER — TRANSITIONAL CARE MANAGEMENT TELEPHONE ENCOUNTER (OUTPATIENT)
Dept: CALL CENTER | Facility: HOSPITAL | Age: 54
End: 2024-06-10
Payer: COMMERCIAL

## 2024-06-10 RX ORDER — CLOPIDOGREL BISULFATE 75 MG/1
75 TABLET ORAL DAILY
Qty: 90 TABLET | Refills: 3 | Status: SHIPPED | OUTPATIENT
Start: 2024-06-10

## 2024-06-10 NOTE — PAYOR COMM NOTE
"    Inpatient order 6/7/24  D/c on 6/9/24    ER admit -   MD notes and clinical attached.   ---------------  AUTHORIZATION PENDING:   PLEASE FAX OR CALL DETERMINATION TO CONTACT BELOW:       THANK YOU,    NATALI BurnhamN, RN  Utilization Review  [facility]  Phone: [phone]  Fax: [phone]      NPI: [id number]  Tax ID: [id number]        Yadira Huitron (53 y.o. Male)       Date of Birth   1970    Social Security Number       Address   [address] [place] [facility]    Home Phone   [phone]    MRN   [id number]       Confucianism   None    Marital Status   Single                            Admission Date   6/7/24    Admission Type   Emergency    Admitting Provider   Jennifer Lara MD    Attending Provider       Department, Room/Bed   [facility] PROGRESS CARE, 2124/1       Discharge Date   6/9/2024    Discharge Disposition   Home or Self Care    Discharge Destination                                 Attending Provider: (none)   Allergies: No Known Allergies    Isolation: None   Infection: None   Code Status: Prior    Ht: 182.9 cm (72\")   Wt: 96 kg (211 lb 10.3 oz)    Admission Cmt: None   Principal Problem: ACS (acute coronary syndrome) [I24.9]                   Active Insurance as of 6/7/2024       Primary Coverage       Payor Plan Insurance Group Employer/Plan Group    [employer]HE[employer] BLUE CROSS AN[employer]M BLUE CROSS BLUE SHIELD PPO [id number]       Payor Plan Address Payor Plan Phone Number Payor Plan Fax Number Effective Dates    [address] [phone]  1/1/2023 - None Entered    [place] [address]         Subscriber Name Subscriber Birth Date Member ID       YADIRA HUITRON 1970 [id number]                     Emergency Contacts        (Rel.) Home Phone Work Phone Mobile Phone    EULALIA CIFUENTES (Significant Other) [phone] -- [phone]                 History & Physical        Pierre James MD at 06/08/24 1021            H&P reviewed. The patient was " examined and there are no changes to the H&P.      Electronically signed by Pierre James MD at 06/08/24 1021   Source Note: H&P (View-Only)            Referring Provider: Silverio Nolen MD  Reason for Consultation:  Unstable angina  Non-STEMI    Patient Care Team:  Mikey Keller MD as PCP - General (Internal Medicine)    Chief complaint  Chest pain    Subjective.     History of present illness:  Logan Huitron is a 53 y.o. male who presents with history of wrist pain.  Patient was playing golf on Wednesday and yesterday when he started having chest discomfort substernal heaviness and tightness without any radiation of the discomfort into the neck or into the arms.  Patient did not seek medical help and patient went to [facility] yesterday where he was found to have anterior loss of R wave and elevated troponin.  Patient maintain pain-free status.  Patient was started on intravenous heparin.  Patient was transferred last night from [facility] to [facility] for further care.  Cardiology consultation was requested.  Patient remained asymptomatic.  Patient apparently drinks a sixpack of beer every day.    ROS      Today, the patient has been without any chest discomfort, shortness of breath, palpitations, dizziness or syncope.  Denies having any headache, abdominal pain, nausea, vomiting, diarrhea, constipation, loss of weight or loss of appetite.  Denies having any excessive bruising, hematuria or blood in the stool.    Review of all systems negative except as indicated      History  History reviewed. No pertinent past medical history.    Past Surgical History:   Procedure Laterality Date    HAND SURGERY      TONSILLECTOMY         Family History   Problem Relation Age of Onset    No Known Problems Mother     No Known Problems Father        Social History     Tobacco Use    Smoking status: Former     Current packs/day: 0.00     Average packs/day: 0.5 packs/day for 2.0 years (1.0 ttl pk-yrs)  "    Types: Cigarettes     Start date: 1989     Quit date: 1991     Years since quittin.6    Smokeless tobacco: Former     Types: Chew     Quit date:    Vaping Use    Vaping status: Never Used   Substance Use Topics    Alcohol use: Yes     Alcohol/week: 6.0 standard drinks of alcohol     Types: 6 Cans of beer per week    Drug use: Never        Medications Prior to Admission   Medication Sig Dispense Refill Last Dose    amoxicillin-clavulanate (AUGMENTIN) 875-125 MG per tablet Take 1 tablet by mouth 2 (Two) Times a Day. 14 tablet 0          Patient has no known allergies.    Scheduled Meds:aspirin, 81 mg, Oral, Daily  atorvastatin, 80 mg, Oral, Nightly  sodium chloride, 10 mL, Intravenous, Q12H  sodium chloride, 3 mL, Intravenous, Q12H      Continuous Infusions:heparin, 10.8 Units/kg/hr, Last Rate: 13.8 Units/kg/hr (24)  sodium chloride, 75 mL/hr, Last Rate: 75 mL/hr (24)      PRN Meds:.  Calcium Replacement - Follow Nurse / BPA Driven Protocol    heparin    heparin    Magnesium Standard Dose Replacement - Follow Nurse / BPA Driven Protocol    nitroglycerin    ondansetron ODT **OR** ondansetron    Phosphorus Replacement - Follow Nurse / BPA Driven Protocol    Potassium Replacement - Follow Nurse / BPA Driven Protocol    sodium chloride    sodium chloride    sodium chloride    sodium chloride    sodium chloride    Objective    VITAL SIGNS  Vitals:    24 0003 24 0539   BP: 125/94  123/85 119/83   BP Location: Left arm  Left arm Left arm   Patient Position: Lying  Lying Lying   Pulse: 96  98 97   Resp: 13  17 13   Temp: 98.2 °F (36.8 °C)  99.1 °F (37.3 °C) 99.5 °F (37.5 °C)   TempSrc: Oral  Oral Oral   SpO2: 97%  97% 97%   Weight:  94.1 kg (207 lb 7.3 oz)     Height:           Flowsheet Rows      Flowsheet Row First Filed Value   Admission Height 182.9 cm (72\") Documented at 2024 1524   Admission Weight 92.5 kg (204 lb) Documented at " 06/07/2024 1524              Intake/Output Summary (Last 24 hours) at 6/8/2024 0553  Last data filed at 6/8/2024 0539  Gross per 24 hour   Intake 1240 ml   Output 900 ml   Net 340 ml        TELEMETRY: Sinus rhythm    Physical Exam:  The patient is alert, oriented and in no distress.  Vital signs as noted above.  Head and neck revealed no carotid bruits or jugular venous distention.  No thyromegaly or lymphadenopathy is present  Lungs clear.  No wheezing.  Breath sounds are normal bilaterally.  Heart normal first and second heart sounds. No murmur.  No precordial rub is present.  No gallop is present.  Abdomen soft and nontender.  No organomegaly is present.  Extremities with good peripheral pulses without any pedal edema.  Skin warm and dry.  Musculoskeletal system is grossly normal  CNS grossly normal    Reviewed and updated.    Results Review:   I reviewed the patient's new clinical results.  Lab Results (last 24 hours)       Procedure Component Value Units Date/Time    Basic Metabolic Panel [[id number]] Collected: 06/08/24 0550    Specimen: Blood from Arm, Left Updated: 06/08/24 0551    Hemoglobin A1c [[id number]]  (Normal) Collected: 06/07/24 2259    Specimen: Blood from Arm, Left Updated: 06/08/24 0000     Hemoglobin A1C 5.42 %     aPTT [[id number]]  (Abnormal) Collected: 06/07/24 2259    Specimen: Blood from Arm, Left Updated: 06/07/24 2330     PTT 34.8 seconds     Protime-INR [[id number]]  (Normal) Collected: 06/07/24 2259    Specimen: Blood from Arm, Left Updated: 06/07/24 2330     Protime 10.6 Seconds      INR 0.97    CBC & Differential [[id number]]  (Abnormal) Collected: 06/07/24 2259    Specimen: Blood from Arm, Left Updated: 06/07/24 2307    Narrative:      The following orders were created for panel order CBC & Differential.  Procedure                               Abnormality         Status                     ---------                               -----------         ------                     CBC Auto  Differential[[id number]]        Abnormal            Final result                 Please view results for these tests on the individual orders.    CBC Auto Differential [[id number]]  (Abnormal) Collected: 06/07/24 2259    Specimen: Blood from Arm, Left Updated: 06/07/24 2307     WBC 10.50 10*3/mm3      RBC 4.04 10*6/mm3      Hemoglobin 13.0 g/dL      Hematocrit 39.1 %      MCV 96.8 fL      MCH 32.2 pg      MCHC 33.2 g/dL      RDW 12.4 %      RDW-SD 44.3 fl      MPV 10.1 fL      Platelets 186 10*3/mm3      Neutrophil % 71.6 %      Lymphocyte % 17.2 %      Monocyte % 10.1 %      Eosinophil % 0.4 %      Basophil % 0.4 %      Immature Grans % 0.3 %      Neutrophils, Absolute 7.52 10*3/mm3      Lymphocytes, Absolute 1.81 10*3/mm3      Monocytes, Absolute 1.06 10*3/mm3      Eosinophils, Absolute 0.04 10*3/mm3      Basophils, Absolute 0.04 10*3/mm3      Immature Grans, Absolute 0.03 10*3/mm3      nRBC 0.0 /100 WBC     High Sensitivity Troponin T [[id number]]  (Abnormal) Collected: 06/07/24 2110    Specimen: Blood from Arm, Left Updated: 06/07/24 2154     HS Troponin T 2,516 ng/L     Narrative:      High Sensitive Troponin T Reference Range:  <14.0 ng/L- Negative Female for AMI  <22.0 ng/L- Negative Male for AMI  >=14 - Abnormal Female indicating possible myocardial injury.  >=22 - Abnormal Male indicating possible myocardial injury.   Clinicians would have to utilize clinical acumen, EKG, Troponin, and serial changes to determine if it is an Acute Myocardial Infarction or myocardial injury due to an underlying chronic condition.         Lipid Panel [[id number]]  (Abnormal) Collected: 06/07/24 2110    Specimen: Blood from Arm, Left Updated: 06/07/24 2153     Total Cholesterol 235 mg/dL      Triglycerides 369 mg/dL      HDL Cholesterol 35 mg/dL      LDL Cholesterol  133 mg/dL      VLDL Cholesterol 67 mg/dL      LDL/HDL Ratio 3.61    Narrative:      Cholesterol Reference Ranges  (U.S. Department of Health and Human Services ATP  III Classifications)    Desirable          <200 mg/dL  Borderline High    200-239 mg/dL  High Risk          >240 mg/dL      Triglyceride Reference Ranges  (U.S. Department of Health and Human Services ATP III Classifications)    Normal           <150 mg/dL  Borderline High  150-199 mg/dL  High             200-499 mg/dL  Very High        >500 mg/dL    HDL Reference Ranges  (U.S. Department of Health and Human Services ATP III Classifications)    Low     <40 mg/dl (major risk factor for CHD)  High    >60 mg/dl ('negative' risk factor for CHD)        LDL Reference Ranges  (U.S. Department of Health and Human Services ATP III Classifications)    Optimal          <100 mg/dL  Near Optimal     100-129 mg/dL  Borderline High  130-159 mg/dL  High             160-189 mg/dL  Very High        >189 mg/dL    TSH [[id number]]  (Normal) Collected: 06/07/24 2110    Specimen: Blood from Arm, Left Updated: 06/07/24 2153     TSH 3.230 uIU/mL     Protime-INR [[id number]]  (Normal) Collected: 06/07/24 2110    Specimen: Blood from Arm, Left Updated: 06/07/24 2149     Protime 10.5 Seconds      INR 0.96    BNP [[id number]]  (Normal) Collected: 06/07/24 1835    Specimen: Blood Updated: 06/07/24 2127     proBNP 665.7 pg/mL     Narrative:      This assay is used as an aid in the diagnosis of individuals suspected of having heart failure. It can be used as an aid in the diagnosis of acute decompensated heart failure (ADHF) in patients presenting with signs and symptoms of ADHF to the emergency department (ED). In addition, NT-proBNP of <300 pg/mL indicates ADHF is not likely.    Age Range Result Interpretation  NT-proBNP Concentration (pg/mL:      <50             Positive            >450                   Gray                 300-450                    Negative             <300    50-75           Positive            >900                  Gray                300-900                  Negative            <300      >75             Positive             >1800                  Singh                300-1800                  Negative            <300    Magnesium [[id number]]  (Normal) Collected: 06/07/24 1835    Specimen: Blood Updated: 06/07/24 2111     Magnesium 2.1 mg/dL     High Sensitivity Troponin T 2Hr [[id number]]  (Abnormal) Collected: 06/07/24 1835    Specimen: Blood Updated: 06/07/24 1856     HS Troponin T 2,156 ng/L      Troponin T Delta 1,494 ng/L     Narrative:      High Sensitive Troponin T Reference Range:  <14.0 ng/L- Negative Female for AMI  <22.0 ng/L- Negative Male for AMI  >=14 - Abnormal Female indicating possible myocardial injury.  >=22 - Abnormal Male indicating possible myocardial injury.   Clinicians would have to utilize clinical acumen, EKG, Troponin, and serial changes to determine if it is an Acute Myocardial Infarction or myocardial injury due to an underlying chronic condition.         aPTT [[id number]]  (Abnormal) Collected: 06/07/24 1533    Specimen: Blood Updated: 06/07/24 1842     PTT 26.5 seconds     POC Protime / INR [[id number]] Collected: 06/07/24 1706    Specimen: Blood from Hand, Left Updated: 06/07/24 1706     Protime 11.0 seconds      INR 1    Comprehensive Metabolic Panel [[id number]]  (Abnormal) Collected: 06/07/24 1551    Specimen: Blood Updated: 06/07/24 1612     Glucose 96 mg/dL      BUN 19 mg/dL      Creatinine 1.02 mg/dL      Sodium 136 mmol/L      Potassium 3.7 mmol/L      Chloride 101 mmol/L      CO2 24.1 mmol/L      Calcium 9.5 mg/dL      Total Protein 6.0 g/dL      Albumin 4.2 g/dL      ALT (SGPT) 32 U/L      AST (SGOT) 80 U/L      Alkaline Phosphatase 101 U/L      Total Bilirubin 0.2 mg/dL      Globulin 1.8 gm/dL      A/G Ratio 2.3 g/dL      BUN/Creatinine Ratio 18.6     Anion Gap 10.9 mmol/L      eGFR 87.3 mL/min/1.73     Narrative:      GFR Normal >60  Chronic Kidney Disease <60  Kidney Failure <15      Lipase [[id number]]  (Abnormal) Collected: 06/07/24 1551    Specimen: Blood Updated: 06/07/24 1612     Lipase 62  U/L     High Sensitivity Troponin T [[id number]]  (Abnormal) Collected: 06/07/24 1551    Specimen: Blood Updated: 06/07/24 1608     HS Troponin T 662 ng/L     Narrative:      High Sensitive Troponin T Reference Range:  <14.0 ng/L- Negative Female for AMI  <22.0 ng/L- Negative Male for AMI  >=14 - Abnormal Female indicating possible myocardial injury.  >=22 - Abnormal Male indicating possible myocardial injury.   Clinicians would have to utilize clinical acumen, EKG, Troponin, and serial changes to determine if it is an Acute Myocardial Infarction or myocardial injury due to an underlying chronic condition.         [place] Draw [[id number]] Collected: 06/07/24 1533    Specimen: Blood Updated: 06/07/24 1546    Narrative:      The following orders were created for panel order [place] Draw.  Procedure                               Abnormality         Status                     ---------                               -----------         ------                     Green Top (Gel)[[id number]]                                  Final result               Lavender Top[[id number]]                                     Final result               Gold Top - SST[[id number]]                                   Final result               Light Blue Top[[id number]]                                   Final result               Green Top (Gel)[[id number]]                                                               Please view results for these tests on the individual orders.    Green Top (Gel) [[id number]] Collected: 06/07/24 1533    Specimen: Blood Updated: 06/07/24 1546     Extra Tube Hold for add-ons.     Comment: Auto resulted.       Lavender Top [[id number]] Collected: 06/07/24 1533    Specimen: Blood Updated: 06/07/24 1546     Extra Tube hold for add-on     Comment: Auto resulted       Gold Top - SST [[id number]] Collected: 06/07/24 1533    Specimen: Blood Updated: 06/07/24 1546     Extra Tube Hold for add-ons.     Comment: Auto resulted.        Light Blue Top [[id number]] Collected: 06/07/24 1533    Specimen: Blood Updated: 06/07/24 1546     Extra Tube Hold for add-ons.     Comment: Auto resulted       CBC & Differential [[id number]]  (Abnormal) Collected: 06/07/24 1533    Specimen: Blood Updated: 06/07/24 1538    Narrative:      The following orders were created for panel order CBC & Differential.  Procedure                               Abnormality         Status                     ---------                               -----------         ------                     CBC Auto Differential[[id number]]        Abnormal            Final result                 Please view results for these tests on the individual orders.    CBC Auto Differential [[id number]]  (Abnormal) Collected: 06/07/24 1533    Specimen: Blood Updated: 06/07/24 1538     WBC 10.29 10*3/mm3      RBC 4.40 10*6/mm3      Hemoglobin 14.1 g/dL      Hematocrit 42.9 %      MCV 97.5 fL      MCH 32.0 pg      MCHC 32.9 g/dL      RDW 12.3 %      RDW-SD 44.4 fl      MPV 9.0 fL      Platelets 275 10*3/mm3      Neutrophil % 69.8 %      Lymphocyte % 19.1 %      Monocyte % 9.7 %      Eosinophil % 0.9 %      Basophil % 0.4 %      Immature Grans % 0.1 %      Neutrophils, Absolute 7.18 10*3/mm3      Lymphocytes, Absolute 1.97 10*3/mm3      Monocytes, Absolute 1.00 10*3/mm3      Eosinophils, Absolute 0.09 10*3/mm3      Basophils, Absolute 0.04 10*3/mm3      Immature Grans, Absolute 0.01 10*3/mm3             Imaging Results (Last 24 Hours)       Procedure Component Value Units Date/Time    XR Chest 2 View [[id number]] Collected: 06/07/24 1613     Updated: 06/07/24 1615    Narrative:      XR CHEST 2 VW    Date of Exam: 6/7/2024 4:00 PM EDT    Indication: Chest Pain Triage Protocol    Comparison: None available.    Findings:  No acute airspace disease. Normal heart size. No pleural effusion, pneumothorax or acute osseous abnormality.      Impression:      Impression:  No acute chest finding.      Electronically  Signed: Park Escoto MD    6/7/2024 4:13 PM EDT    Workstation ID: [id number]        LAB RESULTS (LAST 7 DAYS)    CBC  Results from last 7 days   Lab Units 06/07/24  2259 06/07/24  1533   WBC 10*3/mm3 10.50 10.29   RBC 10*6/mm3 4.04* 4.40   HEMOGLOBIN g/dL 13.0 14.1   HEMATOCRIT % 39.1 42.9   MCV fL 96.8 97.5*   PLATELETS 10*3/mm3 186 275       BMP  Results from last 7 days   Lab Units 06/07/24  1835 06/07/24  1551   SODIUM mmol/L  --  136   POTASSIUM mmol/L  --  3.7   CHLORIDE mmol/L  --  101   CO2 mmol/L  --  24.1   BUN mg/dL  --  19   CREATININE mg/dL  --  1.02   GLUCOSE mg/dL  --  96   MAGNESIUM mg/dL 2.1  --        CMP   Results from last 7 days   Lab Units 06/07/24  1551   SODIUM mmol/L 136   POTASSIUM mmol/L 3.7   CHLORIDE mmol/L 101   CO2 mmol/L 24.1   BUN mg/dL 19   CREATININE mg/dL 1.02   GLUCOSE mg/dL 96   ALBUMIN g/dL 4.2   BILIRUBIN mg/dL 0.2   ALK PHOS U/L 101   AST (SGOT) U/L 80*   ALT (SGPT) U/L 32   LIPASE U/L 62*         BNP        TROPONIN  Results from last 7 days   Lab Units 06/07/24  2110   HSTROP T ng/L 2,516*       CoAg  Results from last 7 days   Lab Units 06/07/24 2259 06/07/24  2110 06/07/24  1706 06/07/24  1533   INR  0.97 0.96 1  --    APTT seconds 34.8*  --   --  26.5*       Creatinine Clearance  Estimated Creatinine Clearance: 98.6 mL/min (by C-G formula based on SCr of 1.02 mg/dL).    ABG        Radiology  XR Chest 2 View    Result Date: 6/7/2024  Impression: No acute chest finding. Electronically Signed: Park Escoto MD  6/7/2024 4:13 PM EDT  Workstation ID: [id number]       EKG                            I personally viewed and interpreted the patient's EKG/Telemetry data: Sinus rhythm poor R wave progression anteriorly suggestive of anteroseptal infarction age undetermined.  ST-T wave abnormalities-stable.    ECHOCARDIOGRAM:        STRESS TEST        Cardiolite (Tc-99m sestamibi) stress test    HEART CATHETERIZATION  No results found for this or any previous visit.      OTHER:      Assessment & Plan    Principal Problem:    ACS (acute coronary syndrome)    ]]]]]]]]]]]]]]]]]]]]]]]  Impression  ==============  -Unstable angina  Non-STEMI.  Troponin [phone]    - Dyslipidemia  Cholesterol 235  VLDL 67  Start high-intensity statin.    - History of excessive alcohol consumption.  Patient apparently drinks 6 beers a day.    - History of hand surgery and tonsillectomy.    - Family history of coronary artery disease    - Former smoker.    - No known allergies    =================  Plan  =================  Patient is on intravenous heparin.  Patient to have an echocardiogram to assess left ventricular function..  Unstable angina-pain-free at this time  Non-STEMI  Patient to have cardiac catheterization and coronary arteriography soon possible.  Risks and benefits pros and cons of the procedure including infection bleeding blood clot heart attack stroke allergic reaction to the dye renal dysfunction etc. were discussed.    Start statins  Start beta-blocker    Further plan will depend on patient's progress.  ]]]]]]]]]]]]]]]]]]]]]]]]]    Pierre James MD  06/08/24  05:53 EDT              Electronically signed by Pierre James MD at 06/08/24 0960                 Pierre James MD at 06/08/24 0513            Referring Provider: Silverio Nolen MD  Reason for Consultation:  Unstable angina  Non-STEMI    Patient Care Team:  Mikey Keller MD as PCP - General (Internal Medicine)    Chief complaint  Chest pain    Subjective.     History of present illness:  Logan Huitron is a 53 y.o. male who presents with history of wrist pain.  Patient was playing golf on Wednesday and yesterday when he started having chest discomfort substernal heaviness and tightness without any radiation of the discomfort into the neck or into the arms.  Patient did not seek medical help and patient went to [facility] yesterday where he was found to have anterior loss of R wave and elevated troponin.   Patient maintain pain-free status.  Patient was started on intravenous heparin.  Patient was transferred last night from [facility] to [facility] [place] by [facility] for further care.  Cardiology consultation was requested.  Patient remained asymptomatic.  Patient apparently drinks a sixpack of beer every day.    ROS      Today, the patient has been without any chest discomfort, shortness of breath, palpitations, dizziness or syncope.  Denies having any headache, abdominal pain, nausea, vomiting, diarrhea, constipation, loss of weight or loss of appetite.  Denies having any excessive bruising, hematuria or blood in the stool.    Review of all systems negative except as indicated      History  History reviewed. No pertinent past medical history.    Past Surgical History:   Procedure Laterality Date    HAND SURGERY      TONSILLECTOMY         Family History   Problem Relation Age of Onset    No Known Problems Mother     No Known Problems Father        Social History     Tobacco Use    Smoking status: Former     Current packs/day: 0.00     Average packs/day: 0.5 packs/day for 2.0 years (1.0 ttl pk-yrs)     Types: Cigarettes     Start date: 1989     Quit date: 1991     Years since quittin.6    Smokeless tobacco: Former     Types: Chew     Quit date:    Vaping Use    Vaping status: Never Used   Substance Use Topics    Alcohol use: Yes     Alcohol/week: 6.0 standard drinks of alcohol     Types: 6 Cans of beer per week    Drug use: Never        Medications Prior to Admission   Medication Sig Dispense Refill Last Dose    amoxicillin-clavulanate (AUGMENTIN) 875-125 MG per tablet Take 1 tablet by mouth 2 (Two) Times a Day. 14 tablet 0          Patient has no known allergies.    Scheduled Meds:aspirin, 81 mg, Oral, Daily  atorvastatin, 80 mg, Oral, Nightly  sodium chloride, 10 mL, Intravenous, Q12H  sodium chloride, 3 mL, Intravenous, Q12H      Continuous Infusions:heparin, 10.8 Units/kg/hr, Last Rate: 13.8 Units/kg/hr  "(06/07/24 7464)  sodium chloride, 75 mL/hr, Last Rate: 75 mL/hr (06/07/24 2059)      PRN Meds:.  Calcium Replacement - Follow Nurse / BPA Driven Protocol    heparin    heparin    Magnesium Standard Dose Replacement - Follow Nurse / BPA Driven Protocol    nitroglycerin    ondansetron ODT **OR** ondansetron    Phosphorus Replacement - Follow Nurse / BPA Driven Protocol    Potassium Replacement - Follow Nurse / BPA Driven Protocol    sodium chloride    sodium chloride    sodium chloride    sodium chloride    sodium chloride    Objective    VITAL SIGNS  Vitals:    06/07/24 2047 06/07/24 2050 06/08/24 0003 06/08/24 0539   BP: 125/94  123/85 119/83   BP Location: Left arm  Left arm Left arm   Patient Position: Lying  Lying Lying   Pulse: 96  98 97   Resp: 13  17 13   Temp: 98.2 °F (36.8 °C)  99.1 °F (37.3 °C) 99.5 °F (37.5 °C)   TempSrc: Oral  Oral Oral   SpO2: 97%  97% 97%   Weight:  94.1 kg (207 lb 7.3 oz)     Height:           Flowsheet Rows      Flowsheet Row First Filed Value   Admission Height 182.9 cm (72\") Documented at 06/07/2024 1524   Admission Weight 92.5 kg (204 lb) Documented at 06/07/2024 1524              Intake/Output Summary (Last 24 hours) at 6/8/2024 0553  Last data filed at 6/8/2024 0539  Gross per 24 hour   Intake 1240 ml   Output 900 ml   Net 340 ml        TELEMETRY: Sinus rhythm    Physical Exam:  The patient is alert, oriented and in no distress.  Vital signs as noted above.  Head and neck revealed no carotid bruits or jugular venous distention.  No thyromegaly or lymphadenopathy is present  Lungs clear.  No wheezing.  Breath sounds are normal bilaterally.  Heart normal first and second heart sounds. No murmur.  No precordial rub is present.  No gallop is present.  Abdomen soft and nontender.  No organomegaly is present.  Extremities with good peripheral pulses without any pedal edema.  Skin warm and dry.  Musculoskeletal system is grossly normal  CNS grossly normal    Reviewed and " updated.    Results Review:   I reviewed the patient's new clinical results.  Lab Results (last 24 hours)       Procedure Component Value Units Date/Time    Basic Metabolic Panel [[id number]] Collected: 06/08/24 0550    Specimen: Blood from Arm, Left Updated: 06/08/24 0551    Hemoglobin A1c [[id number]]  (Normal) Collected: 06/07/24 2259    Specimen: Blood from Arm, Left Updated: 06/08/24 0000     Hemoglobin A1C 5.42 %     aPTT [[id number]]  (Abnormal) Collected: 06/07/24 2259    Specimen: Blood from Arm, Left Updated: 06/07/24 2330     PTT 34.8 seconds     Protime-INR [[id number]]  (Normal) Collected: 06/07/24 2259    Specimen: Blood from Arm, Left Updated: 06/07/24 2330     Protime 10.6 Seconds      INR 0.97    CBC & Differential [[id number]]  (Abnormal) Collected: 06/07/24 2259    Specimen: Blood from Arm, Left Updated: 06/07/24 2307    Narrative:      The following orders were created for panel order CBC & Differential.  Procedure                               Abnormality         Status                     ---------                               -----------         ------                     CBC Auto Differential[[id number]]        Abnormal            Final result                 Please view results for these tests on the individual orders.    CBC Auto Differential [[id number]]  (Abnormal) Collected: 06/07/24 2259    Specimen: Blood from Arm, Left Updated: 06/07/24 2307     WBC 10.50 10*3/mm3      RBC 4.04 10*6/mm3      Hemoglobin 13.0 g/dL      Hematocrit 39.1 %      MCV 96.8 fL      MCH 32.2 pg      MCHC 33.2 g/dL      RDW 12.4 %      RDW-SD 44.3 fl      MPV 10.1 fL      Platelets 186 10*3/mm3      Neutrophil % 71.6 %      Lymphocyte % 17.2 %      Monocyte % 10.1 %      Eosinophil % 0.4 %      Basophil % 0.4 %      Immature Grans % 0.3 %      Neutrophils, Absolute 7.52 10*3/mm3      Lymphocytes, Absolute 1.81 10*3/mm3      Monocytes, Absolute 1.06 10*3/mm3      Eosinophils, Absolute 0.04 10*3/mm3      Basophils,  Absolute 0.04 10*3/mm3      Immature Grans, Absolute 0.03 10*3/mm3      nRBC 0.0 /100 WBC     High Sensitivity Troponin T [[id number]]  (Abnormal) Collected: 06/07/24 2110    Specimen: Blood from Arm, Left Updated: 06/07/24 2154     HS Troponin T 2,516 ng/L     Narrative:      High Sensitive Troponin T Reference Range:  <14.0 ng/L- Negative Female for AMI  <22.0 ng/L- Negative Male for AMI  >=14 - Abnormal Female indicating possible myocardial injury.  >=22 - Abnormal Male indicating possible myocardial injury.   Clinicians would have to utilize clinical acumen, EKG, Troponin, and serial changes to determine if it is an Acute Myocardial Infarction or myocardial injury due to an underlying chronic condition.         Lipid Panel [[id number]]  (Abnormal) Collected: 06/07/24 2110    Specimen: Blood from Arm, Left Updated: 06/07/24 2153     Total Cholesterol 235 mg/dL      Triglycerides 369 mg/dL      HDL Cholesterol 35 mg/dL      LDL Cholesterol  133 mg/dL      VLDL Cholesterol 67 mg/dL      LDL/HDL Ratio 3.61    Narrative:      Cholesterol Reference Ranges  (U.S. Department of Health and Human Services ATP III Classifications)    Desirable          <200 mg/dL  Borderline High    200-239 mg/dL  High Risk          >240 mg/dL      Triglyceride Reference Ranges  (U.S. Department of Health and Human Services ATP III Classifications)    Normal           <150 mg/dL  Borderline High  150-199 mg/dL  High             200-499 mg/dL  Very High        >500 mg/dL    HDL Reference Ranges  (U.S. Department of Health and Human Services ATP III Classifications)    Low     <40 mg/dl (major risk factor for CHD)  High    >60 mg/dl ('negative' risk factor for CHD)        LDL Reference Ranges  (U.S. Department of Health and Human Services ATP III Classifications)    Optimal          <100 mg/dL  Near Optimal     100-129 mg/dL  Borderline High  130-159 mg/dL  High             160-189 mg/dL  Very High        >189 mg/dL    TSH [[id number]]   (Normal) Collected: 06/07/24 2110    Specimen: Blood from Arm, Left Updated: 06/07/24 2153     TSH 3.230 uIU/mL     Protime-INR [[id number]]  (Normal) Collected: 06/07/24 2110    Specimen: Blood from Arm, Left Updated: 06/07/24 2149     Protime 10.5 Seconds      INR 0.96    BNP [[id number]]  (Normal) Collected: 06/07/24 1835    Specimen: Blood Updated: 06/07/24 2127     proBNP 665.7 pg/mL     Narrative:      This assay is used as an aid in the diagnosis of individuals suspected of having heart failure. It can be used as an aid in the diagnosis of acute decompensated heart failure (ADHF) in patients presenting with signs and symptoms of ADHF to the emergency department (ED). In addition, NT-proBNP of <300 pg/mL indicates ADHF is not likely.    Age Range Result Interpretation  NT-proBNP Concentration (pg/mL:      <50             Positive            >450                   Gray                 300-450                    Negative             <300    50-75           Positive            >900                  Gray                300-900                  Negative            <300      >75             Positive            >1800                  Gray                300-1800                  Negative            <300    Magnesium [[id number]]  (Normal) Collected: 06/07/24 1835    Specimen: Blood Updated: 06/07/24 2111     Magnesium 2.1 mg/dL     High Sensitivity Troponin T 2Hr [[id number]]  (Abnormal) Collected: 06/07/24 1835    Specimen: Blood Updated: 06/07/24 1856     HS Troponin T 2,156 ng/L      Troponin T Delta 1,494 ng/L     Narrative:      High Sensitive Troponin T Reference Range:  <14.0 ng/L- Negative Female for AMI  <22.0 ng/L- Negative Male for AMI  >=14 - Abnormal Female indicating possible myocardial injury.  >=22 - Abnormal Male indicating possible myocardial injury.   Clinicians would have to utilize clinical acumen, EKG, Troponin, and serial changes to determine if it is an Acute Myocardial Infarction or myocardial  injury due to an underlying chronic condition.         aPTT [[id number]]  (Abnormal) Collected: 06/07/24 1533    Specimen: Blood Updated: 06/07/24 1842     PTT 26.5 seconds     POC Protime / INR [[id number]] Collected: 06/07/24 1706    Specimen: Blood from Hand, Left Updated: 06/07/24 1706     Protime 11.0 seconds      INR 1    Comprehensive Metabolic Panel [[id number]]  (Abnormal) Collected: 06/07/24 1551    Specimen: Blood Updated: 06/07/24 1612     Glucose 96 mg/dL      BUN 19 mg/dL      Creatinine 1.02 mg/dL      Sodium 136 mmol/L      Potassium 3.7 mmol/L      Chloride 101 mmol/L      CO2 24.1 mmol/L      Calcium 9.5 mg/dL      Total Protein 6.0 g/dL      Albumin 4.2 g/dL      ALT (SGPT) 32 U/L      AST (SGOT) 80 U/L      Alkaline Phosphatase 101 U/L      Total Bilirubin 0.2 mg/dL      Globulin 1.8 gm/dL      A/G Ratio 2.3 g/dL      BUN/Creatinine Ratio 18.6     Anion Gap 10.9 mmol/L      eGFR 87.3 mL/min/1.73     Narrative:      GFR Normal >60  Chronic Kidney Disease <60  Kidney Failure <15      Lipase [[id number]]  (Abnormal) Collected: 06/07/24 1551    Specimen: Blood Updated: 06/07/24 1612     Lipase 62 U/L     High Sensitivity Troponin T [[id number]]  (Abnormal) Collected: 06/07/24 1551    Specimen: Blood Updated: 06/07/24 1608     HS Troponin T 662 ng/L     Narrative:      High Sensitive Troponin T Reference Range:  <14.0 ng/L- Negative Female for AMI  <22.0 ng/L- Negative Male for AMI  >=14 - Abnormal Female indicating possible myocardial injury.  >=22 - Abnormal Male indicating possible myocardial injury.   Clinicians would have to utilize clinical acumen, EKG, Troponin, and serial changes to determine if it is an Acute Myocardial Infarction or myocardial injury due to an underlying chronic condition.         [place] Draw [[id number]] Collected: 06/07/24 1533    Specimen: Blood Updated: 06/07/24 1546    Narrative:      The following orders were created for panel order [place] Draw.  Procedure                                Abnormality         Status                     ---------                               -----------         ------                     Green Top (Gel)[[id number]]                                  Final result               Lavender Top[[id number]]                                     Final result               Gold Top - SST[[id number]]                                   Final result               Light Blue Top[[id number]]                                   Final result               Green Top (Gel)[[id number]]                                                               Please view results for these tests on the individual orders.    Green Top (Gel) [[id number]] Collected: 06/07/24 1533    Specimen: Blood Updated: 06/07/24 1546     Extra Tube Hold for add-ons.     Comment: Auto resulted.       Lavender Top [[id number]] Collected: 06/07/24 1533    Specimen: Blood Updated: 06/07/24 1546     Extra Tube hold for add-on     Comment: Auto resulted       Gold Top - SST [[id number]] Collected: 06/07/24 1533    Specimen: Blood Updated: 06/07/24 1546     Extra Tube Hold for add-ons.     Comment: Auto resulted.       Light Blue Top [[id number]] Collected: 06/07/24 1533    Specimen: Blood Updated: 06/07/24 1546     Extra Tube Hold for add-ons.     Comment: Auto resulted       CBC & Differential [[id number]]  (Abnormal) Collected: 06/07/24 1533    Specimen: Blood Updated: 06/07/24 1538    Narrative:      The following orders were created for panel order CBC & Differential.  Procedure                               Abnormality         Status                     ---------                               -----------         ------                     CBC Auto Differential[[id number]]        Abnormal            Final result                 Please view results for these tests on the individual orders.    CBC Auto Differential [[id number]]  (Abnormal) Collected: 06/07/24 1533    Specimen: Blood Updated: 06/07/24 1538     WBC 10.29  10*3/mm3      RBC 4.40 10*6/mm3      Hemoglobin 14.1 g/dL      Hematocrit 42.9 %      MCV 97.5 fL      MCH 32.0 pg      MCHC 32.9 g/dL      RDW 12.3 %      RDW-SD 44.4 fl      MPV 9.0 fL      Platelets 275 10*3/mm3      Neutrophil % 69.8 %      Lymphocyte % 19.1 %      Monocyte % 9.7 %      Eosinophil % 0.9 %      Basophil % 0.4 %      Immature Grans % 0.1 %      Neutrophils, Absolute 7.18 10*3/mm3      Lymphocytes, Absolute 1.97 10*3/mm3      Monocytes, Absolute 1.00 10*3/mm3      Eosinophils, Absolute 0.09 10*3/mm3      Basophils, Absolute 0.04 10*3/mm3      Immature Grans, Absolute 0.01 10*3/mm3             Imaging Results (Last 24 Hours)       Procedure Component Value Units Date/Time    XR Chest 2 View [[id number]] Collected: 06/07/24 1613     Updated: 06/07/24 1615    Narrative:      XR CHEST 2 VW    Date of Exam: 6/7/2024 4:00 PM EDT    Indication: Chest Pain Triage Protocol    Comparison: None available.    Findings:  No acute airspace disease. Normal heart size. No pleural effusion, pneumothorax or acute osseous abnormality.      Impression:      Impression:  No acute chest finding.      Electronically Signed: Pakr Escoto MD    6/7/2024 4:13 PM EDT    Workstation ID: [id number]        LAB RESULTS (LAST 7 DAYS)    CBC  Results from last 7 days   Lab Units 06/07/24  2259 06/07/24  1533   WBC 10*3/mm3 10.50 10.29   RBC 10*6/mm3 4.04* 4.40   HEMOGLOBIN g/dL 13.0 14.1   HEMATOCRIT % 39.1 42.9   MCV fL 96.8 97.5*   PLATELETS 10*3/mm3 186 275       BMP  Results from last 7 days   Lab Units 06/07/24  1835 06/07/24  1551   SODIUM mmol/L  --  136   POTASSIUM mmol/L  --  3.7   CHLORIDE mmol/L  --  101   CO2 mmol/L  --  24.1   BUN mg/dL  --  19   CREATININE mg/dL  --  1.02   GLUCOSE mg/dL  --  96   MAGNESIUM mg/dL 2.1  --        CMP   Results from last 7 days   Lab Units 06/07/24  1551   SODIUM mmol/L 136   POTASSIUM mmol/L 3.7   CHLORIDE mmol/L 101   CO2 mmol/L 24.1   BUN mg/dL 19   CREATININE mg/dL 1.02   GLUCOSE  mg/dL 96   ALBUMIN g/dL 4.2   BILIRUBIN mg/dL 0.2   ALK PHOS U/L 101   AST (SGOT) U/L 80*   ALT (SGPT) U/L 32   LIPASE U/L 62*         BNP        TROPONIN  Results from last 7 days   Lab Units 06/07/24 2110   HSTROP T ng/L 2,516*       CoAg  Results from last 7 days   Lab Units 06/07/24  2259 06/07/24  2110 06/07/24  1706 06/07/24  1533   INR  0.97 0.96 1  --    APTT seconds 34.8*  --   --  26.5*       Creatinine Clearance  Estimated Creatinine Clearance: 98.6 mL/min (by C-G formula based on SCr of 1.02 mg/dL).    ABG        Radiology  XR Chest 2 View    Result Date: 6/7/2024  Impression: No acute chest finding. Electronically Signed: Park Escoto MD  6/7/2024 4:13 PM EDT  Workstation ID: [id number]       EKG                            I personally viewed and interpreted the patient's EKG/Telemetry data: Sinus rhythm poor R wave progression anteriorly suggestive of anteroseptal infarction age undetermined.  ST-T wave abnormalities-stable.    ECHOCARDIOGRAM:        STRESS TEST        Cardiolite (Tc-99m sestamibi) stress test    HEART CATHETERIZATION  No results found for this or any previous visit.      OTHER:     Assessment & Plan    Principal Problem:    ACS (acute coronary syndrome)    ]]]]]]]]]]]]]]]]]]]]]]]  Impression  ==============  -Unstable angina  Non-STEMI.  Troponin 662 2156 2516    - Dyslipidemia  Cholesterol 235  VLDL 67  Start high-intensity statin.    - History of excessive alcohol consumption.  Patient apparently drinks 6 beers a day.    - History of hand surgery and tonsillectomy.    - Family history of coronary artery disease    - Former smoker.    - No known allergies    =================  Plan  =================  Patient is on intravenous heparin.  Patient to have an echocardiogram to assess left ventricular function..  Unstable angina-pain-free at this time  Non-STEMI  Patient to have cardiac catheterization and coronary arteriography soon possible.  Risks and benefits pros and cons of  the procedure including infection bleeding blood clot heart attack stroke allergic reaction to the dye renal dysfunction etc. were discussed.    Start statins  Start beta-blocker    Further plan will depend on patient's progress.  ]]]]]]]]]]]]]]]]]]]]]]]]]    Pierre James MD  24  05:53 EDT              Electronically signed by Pierre James MD at 24 0999       Saul Radford DO at 24 2101              [facility] Medicine Services    Hospitalist History and Physical     Logan Huitron : 1970 MRN:[id number] LOS:0 ROOM: [facility]     Chief Complaint: Chest pain    Assessment / Plan     #NSTEMI  #Unstable Angina    - HS troponin 662 > 2156, delta 1494, will treat as NSTEMI    - proBNP 665.7    - lipase 62    - EKG NSR with slight ST changes in V2-V5    - aspirin    -statin    -  heparin drip started at , continue, monitor for bleeding    - cardiology notified, plan for heart cath tomorrow morning    - A1c, lipid panel, tsh    - patient AAOx4 and hemodynamically stable    - Patient high risk      Code Status (Patient has no pulse and is not breathing): CPR (Attempt to Resuscitate)  Medical Interventions (Patient has pulse or is breathing): Full Support         DVT prophylaxis: Heparin drip  Medical DVT prophylaxis orders are present.         History of Present illness     Logan Huitron is a 53 y.o. male who presented to  for chest pain.  Admits to episodic 4/10 chest pain ongoing for the past two days that suddenly worsened while he was playing golf today.  Denies numbness, chills, fevers, dyspnea, vomiting, diarrhea.  Presented to  for evaluation.      ED course: At , labs 98F, HR 84, /90, RR 16, 98% RA.  Labs notable for HS troponin 662, AST 80, lipase 62.  EKG NSR with slight ST changes in V2-V5. CXR without acute cardiopulmonary process.  Cardiology contacted, patient started on heparin drip. Transferred to [facility] for cardiac evaluation.    Patient was seen and examined  on 24 at 21:04 EDT .    Subjective / Review of systems     Review of Systems   12 point ROS reviewed and negative except as mentioned above      Past Medical/Surgical/Social/Family History & Allergies     History reviewed. No pertinent past medical history.   Past Surgical History:   Procedure Laterality Date    HAND SURGERY      TONSILLECTOMY        Social History     Socioeconomic History    Marital status: Single   Tobacco Use    Smoking status: Former     Current packs/day: 0.00     Average packs/day: 0.5 packs/day for 2.0 years (1.0 ttl pk-yrs)     Types: Cigarettes     Start date: 1989     Quit date: 1991     Years since quittin.6    Smokeless tobacco: Former     Types: Chew     Quit date:    Vaping Use    Vaping status: Never Used   Substance and Sexual Activity    Alcohol use: Yes     Alcohol/week: 6.0 standard drinks of alcohol     Types: 6 Cans of beer per week    Drug use: Never    Sexual activity: Defer      Family History   Problem Relation Age of Onset    No Known Problems Mother     No Known Problems Father       No Known Allergies   Social Determinants of Health     Tobacco Use: Medium Risk (2024)    Patient History     Smoking Tobacco Use: Former     Smokeless Tobacco Use: Former     Passive Exposure: Not on file   Alcohol Use: Not on file   Financial Resource Strain: Not on file   Food Insecurity: Not on file   Transportation Needs: Not on file   Physical Activity: Not on file   Stress: Not on file   Social Connections: Unknown (10/10/2023)    Family and Community Support     Help with Day-to-Day Activities: Not on file     Lonely or Isolated: Not on file   Interpersonal Safety: Not At Risk (2024)    Abuse Screen     Unsafe at Home or Work/School: no     Feels Threatened by Someone?: no     Does Anyone Keep You from Contacting Others or Doint Things Outside the Home?: no     Physical Sign of Abuse Present: no   Depression: Not at risk (2023)    PHQ-2     PHQ-2  Score: 0   Housing Stability: Unknown (10/10/2023)    Housing Stability     Current Living Arrangements: Not on file     Potentially Unsafe Housing Conditions: Not on file   Utilities: Not on file   Health Literacy: Unknown (10/10/2023)    Education     Help with school or training?: Not on file     Preferred Language: Not on file   Employment: Unknown (10/10/2023)    Employment     Do you want help finding or keeping work or a job?: Not on file   Disabilities: Unknown (10/10/2023)    Disabilities     Concentrating, Remembering, or Making Decisions Difficulty: Not on file     Doing Errands Independently Difficulty: Not on file        Home Medications     Prior to Admission medications    Medication Sig Start Date End Date Taking? Authorizing Provider   amoxicillin-clavulanate (AUGMENTIN) 875-125 MG per tablet Take 1 tablet by mouth 2 (Two) Times a Day. 2/14/24   Mikey Keller MD        Objective / Physical Exam     Vital signs:  Temp: 98.2 °F (36.8 °C)  BP: 125/94  Heart Rate: 96  Resp: 13  SpO2: 97 %  Weight: 92.5 kg (204 lb)    Admission Weight: Weight: 92.5 kg (204 lb)    Physical Exam  Constitutional:       General: He is not in acute distress.     Appearance: Normal appearance. He is not toxic-appearing.   HENT:      Head: Normocephalic and atraumatic.      Nose: Nose normal. No congestion.      Mouth/Throat:      Pharynx: Oropharynx is clear. No oropharyngeal exudate.   Eyes:      General: No scleral icterus.  Cardiovascular:      Rate and Rhythm: Normal rate and regular rhythm.      Heart sounds: No murmur heard.     No friction rub. No gallop.   Pulmonary:      Effort: No respiratory distress.      Breath sounds: No wheezing or rales.   Abdominal:      General: There is no distension.      Tenderness: There is no abdominal tenderness. There is no guarding.   Musculoskeletal:         General: No swelling or deformity.      Cervical back: Normal range of motion. No rigidity.      Right lower leg: No  edema.      Left lower leg: No edema.   Skin:     Coloration: Skin is not jaundiced.      Findings: No bruising or lesion.   Neurological:      General: No focal deficit present.      Mental Status: He is alert and oriented to person, place, and time.      Motor: No weakness.            Labs     Results from last 7 days   Lab Units 06/07/24  1533   WBC 10*3/mm3 10.29   HEMOGLOBIN g/dL 14.1   HEMATOCRIT % 42.9   PLATELETS 10*3/mm3 275      Results from last 7 days   Lab Units 06/07/24  1551   ALK PHOS U/L 101   AST (SGOT) U/L 80*   ALT (SGPT) U/L 32      Results from last 7 days   Lab Units 06/07/24  1706 06/07/24  1533   PROTIME seconds 11.0  --    INR  1  --    APTT seconds  --  26.5*      Results from last 7 days   Lab Units 06/07/24  1551   SODIUM mmol/L 136   POTASSIUM mmol/L 3.7   CHLORIDE mmol/L 101   CO2 mmol/L 24.1   BUN mg/dL 19   CREATININE mg/dL 1.02   GLUCOSE mg/dL 96        Imaging     XR Chest 2 View    Result Date: 6/7/2024  XR CHEST 2 VW Date of Exam: 6/7/2024 4:00 PM EDT Indication: Chest Pain Triage Protocol Comparison: None available. Findings: No acute airspace disease. Normal heart size. No pleural effusion, pneumothorax or acute osseous abnormality.     Impression: No acute chest finding. Electronically Signed: Park Escoto MD  6/7/2024 4:13 PM EDT  Workstation ID: [id number]        Current Medications     Scheduled Meds:  [START ON 6/8/2024] aspirin, 81 mg, Oral, Daily  atorvastatin, 80 mg, Oral, Nightly  sodium chloride, 10 mL, Intravenous, Q12H  sodium chloride, 3 mL, Intravenous, Q12H         Continuous Infusions:  heparin, 10.8 Units/kg/hr  sodium chloride, 75 mL/hr, Last Rate: 75 mL/hr (06/07/24 2059)           Saul Radford DO   [facility] Medicine  06/07/24   21:04 EDT       Electronically signed by Saul Radford DO at 06/07/24 2237          Emergency Department Notes        Carie Urbina RN at 06/07/24 1958          Report given to Mariya with EMS.  Did explain that MD's are  aware of patients troponin, hep gtt infusing.  Pt left with belongings and paperwork, EMTALA as well.     Electronically signed by Carie Urbina RN at 06/07/24 1959       Maxx Palma RN at 06/07/24 1944          Spoke with Dr. James regarding increase in Toponin. Dr. James states that he will be seen when transferred to [facility] and that noting about the plan of care will change at this time.    Electronically signed by Maxx Palma RN at 06/07/24 1945       Carie Urbina RN at 06/07/24 1940          Patient resting in room, States he is currently in no pain and actually feels better.  Pt second troponin higher than the first.  Per day shift RN, pt to remain on heparin gtt and will be seen at hospital, providers are aware. Pts family at bedside.  Waiting on transport.  Verified heparin dose to order.  Will continue to monitor.     Electronically signed by Carie Urbina RN at 06/07/24 1942       Carie Urbina RN at 06/07/24 1912          Report from JAVAN Lacey     Electronically signed by Carie Urbina RN at 06/07/24 1912       Deepthi Orellana RN at 06/07/24 1910          Report given to Carie Reddy RN    Electronically signed by Deepthi Orellana RN at 06/07/24 1910       Deepthi Orellana RN at 06/07/24 1644          Pt feeling better. HR up to 70s and BP improved. Pt states no chest pain at this time.    Electronically signed by Deepthi Orellana RN at 06/07/24 1645       Deepthi Orellana RN at 06/07/24 1626          Pt called out on call light and stated he was feeling lightheaded. BP and HR dropped. MD informed. Bolus NS started. O2 2L placed and EKG done.    Electronically signed by Deepthi Orellana RN at 06/07/24 1634       Deepthi Orellana RN at 06/07/24 1558          Pt complains of midsternal chest pain since Wednesday. Came in today because it wasn't getting any better. Denies any radiating pain or shortness of breath.    Electronically signed by Deepthi Orellana RN at 06/07/24  1559       Juan Chavarria MD at 24 1558        Procedure Orders    1. ECG 12 Lead [[id number]] ordered by Juan Chavarria MD    2. ECG 12 Lead [[id number]] ordered by Juan Chavarria MD    3. Critical Care [[id number]] ordered by Juan Chavarria MD                 Subjective   History of Present Illness  53-year-old male presents to the emergency department with chief complaint of chest pain.  Patient states his current dull chest pain for the past 4 hours.  Constant.  No radiation.  4 out of 10.  Patient was playing golf and states he noticed worsening of the pain on his back swing.  First episode of the pain was 2 days ago when it lasted for 30 minutes.  He had several episodes that day.  Today's episode is constant without any relief of the pain.  He denies any shortness of breath, nausea, vomiting, diaphoresis, radiation of the pain.  Patient has a history of hypercholesterolemia which is untreated.  Patient also has history of daily drinking up to a sixpack of beer        Review of Systems   Constitutional: Negative.    HENT: Negative.     Respiratory: Negative.     Cardiovascular:  Positive for chest pain.   Gastrointestinal: Negative.    All other systems reviewed and are negative.      History reviewed. No pertinent past medical history.    No Known Allergies    Past Surgical History:   Procedure Laterality Date    HAND SURGERY      TONSILLECTOMY         Family History   Problem Relation Age of Onset    No Known Problems Mother     No Known Problems Father        Social History     Socioeconomic History    Marital status: Single   Tobacco Use    Smoking status: Former     Current packs/day: 0.00     Average packs/day: 0.5 packs/day for 2.0 years (1.0 ttl pk-yrs)     Types: Cigarettes     Start date: 1989     Quit date: 1991     Years since quittin.6    Smokeless tobacco: Former     Types: Chew     Quit date:    Vaping Use    Vaping status: Never Used   Substance and Sexual  Activity    Alcohol use: Yes     Alcohol/week: 6.0 standard drinks of alcohol     Types: 6 Cans of beer per week    Drug use: Never    Sexual activity: Defer           Objective   Physical Exam  Vitals and nursing note reviewed.   Constitutional:       Appearance: He is well-developed.   HENT:      Head: Normocephalic and atraumatic.   Eyes:      Extraocular Movements: Extraocular movements intact.      Pupils: Pupils are equal, round, and reactive to light.   Cardiovascular:      Heart sounds: Normal heart sounds.   Pulmonary:      Effort: Pulmonary effort is normal.      Breath sounds: Normal breath sounds.   Chest:      Chest wall: No mass, tenderness or edema.   Abdominal:      General: Bowel sounds are normal.      Palpations: Abdomen is soft.   Musculoskeletal:         General: Normal range of motion.      Cervical back: Normal range of motion and neck supple.   Skin:     General: Skin is warm and dry.   Neurological:      General: No focal deficit present.      Mental Status: He is alert.   Psychiatric:         Mood and Affect: Mood normal.         Behavior: Behavior normal.         ECG 12 Lead      Date/Time: 6/7/2024 4:02 PM    Performed by: Juan Chavarria MD  Authorized by: Juan Chavarria MD  Previous ECG: no previous ECG available  Rhythm: sinus rhythm  Rate: normal  QRS axis: normal  ST Segments: ST segments normal  T Waves: T waves normal  Q waves: V1, V2 and V3  Clinical impression: abnormal ECG    ECG 12 Lead      Date/Time: 6/7/2024 4:34 PM    Performed by: Juan Chavarria MD  Authorized by: Juan Chavarria MD  Rhythm: sinus rhythm  Rate: bradycardic  Clinical impression: abnormal ECG    Critical Care    Performed by: Juan Chavarrai MD  Authorized by: Juan Chavarria MD    Critical care provider statement:     Critical care time (minutes):  70    Critical care time was exclusive of:  Separately billable procedures and treating other patients    Critical care was necessary to  treat or prevent imminent or life-threatening deterioration of the following conditions:  Circulatory failure    Critical care was time spent personally by me on the following activities:  Ordering and review of laboratory studies, ordering and review of radiographic studies, pulse oximetry, re-evaluation of patient's condition, obtaining history from patient or surrogate, discussions with primary provider and discussions with consultants    I assumed direction of critical care for this patient from another provider in my specialty: yes      Care discussed with: admitting provider and accepting provider at another facility              ED Course  ED Course as of 06/07/24 1657   Fri Jun 07, 2024   1638 Lipase(!): 62 [RW]      ED Course User Index  [RW] Juan Chavarria MD      No change in pain with initial nitro                                     Medical Decision Making  Amount and/or Complexity of Data Reviewed  Independent Historian: spouse  Labs: ordered.  Radiology: ordered.  ECG/medicine tests: ordered and independent interpretation performed. Decision-making details documented in ED Course.  Discussion of management or test interpretation with external provider(s): Internal medicine and cardiology    Risk  OTC drugs.  Prescription drug management.  Decision regarding hospitalization.        Final diagnoses:   Acute transmural myocardial infarct anterior wall, initial hospitaliz   Acute coronary syndrome with high troponin   Alcohol-induced acute pancreatitis, unspecified complication status   Alcohol abuse   Hypercholesterolemia       ED Disposition  ED Disposition       ED Disposition   Decision to Admit    Condition   --    Comment   Level of Care: Progressive Care [20]   Diagnosis: ACS (acute coronary syndrome) [786021]   Admitting Physician: EDY ALDRIDGE [[id number]]   Attending Physician: EDY ALDRIDGE [[id number]]   Certification: I Certify That Inpatient Hospital Services Are Medically Necessary For Greater Than  2 Midnights                 No follow-up provider specified.       Medication List      No changes were made to your prescriptions during this visit.           Electronically signed by Juan Chavarria MD at 06/07/24 1657       Facility-Administered Medications as of 6/9/2024   Medication Dose Route Frequency Provider Last Rate Last Admin    [COMPLETED] aspirin tablet 325 mg  325 mg Oral Once Juan Chavarria MD   325 mg at 06/07/24 1557    [COMPLETED] atropine sulfate injection   Intravenous Code / Trauma / Sedation Medication Silverio Nolen MD   0.5 mg at 06/08/24 1432    [COMPLETED] sodium chloride 0.9 % bolus 1,000 mL  1,000 mL Intravenous Once Juan Chavarria MD   Stopped at 06/07/24 1909    [COMPLETED] sodium chloride 0.9 % infusion   Intravenous Code / Trauma / Sedation Continuous Med Silverio Nolen  mL/hr at 06/08/24 1430 1,000 mL at 06/08/24 1430        Physician Progress Notes (last 48 hours)        Pierre James MD at 06/09/24 0739          Referring Provider: Salinas Santiago MD    Reason for follow-up:  Unstable angina  Non-STEMI  Status post stent to LAD 6/8/2024     Patient Care Team:  Mikey Keller MD as PCP - General (Internal Medicine)    Subjective .      ROS    Today, the patient has been without any chest discomfort ,shortness of breath, palpitations, dizziness or syncope.  Denies having any headache ,abdominal pain ,nausea, vomiting , diarrhea constipation, loss of weight or loss of appetite.  Denies having any excessive bruising ,hematuria or blood in the stool.    Review of all systems negative except as indicated    History  History reviewed. No pertinent past medical history.    Past Surgical History:   Procedure Laterality Date    HAND SURGERY      TONSILLECTOMY         Family History   Problem Relation Age of Onset    No Known Problems Mother     No Known Problems Father        Social History     Tobacco Use    Smoking status: Former     Current packs/day: 0.00  "    Average packs/day: 0.5 packs/day for 2.0 years (1.0 ttl pk-yrs)     Types: Cigarettes     Start date: 1989     Quit date: 1991     Years since quittin.6    Smokeless tobacco: Former     Types: Chew     Quit date:    Vaping Use    Vaping status: Never Used   Substance Use Topics    Alcohol use: Yes     Alcohol/week: 6.0 standard drinks of alcohol     Types: 6 Cans of beer per week    Drug use: Never        Medications Prior to Admission   Medication Sig Dispense Refill Last Dose    amoxicillin-clavulanate (AUGMENTIN) 875-125 MG per tablet Take 1 tablet by mouth 2 (Two) Times a Day. 14 tablet 0        Allergies  Patient has no known allergies.    Scheduled Meds:aspirin, 81 mg, Oral, Daily  atorvastatin, 80 mg, Oral, Nightly  atropine, 0.5 mg, Intravenous, Once  lisinopril, 5 mg, Oral, Q24H  metoprolol succinate XL, 25 mg, Oral, Q24H  sodium chloride, 10 mL, Intravenous, Q12H  ticagrelor, 90 mg, Oral, BID      Continuous Infusions:   PRN Meds:.  acetaminophen    Calcium Replacement - Follow Nurse / BPA Driven Protocol    diphenhydrAMINE    Magnesium Standard Dose Replacement - Follow Nurse / BPA Driven Protocol    nitroglycerin    ondansetron ODT **OR** ondansetron    Phosphorus Replacement - Follow Nurse / BPA Driven Protocol    Potassium Replacement - Follow Nurse / BPA Driven Protocol    sodium chloride    sodium chloride    sodium chloride    Objective     VITAL SIGNS  Vitals:    24 1713 24 2045 24 0130 24 0517   BP: 115/81 108/74 96/68 105/67   BP Location: Right arm Right arm Right arm Right arm   Patient Position: Lying Lying Lying Lying   Pulse: 95 103 81 90   Resp: 16 18 12 14   Temp: 98.6 °F (37 °C) 98.3 °F (36.8 °C) 98 °F (36.7 °C) 97.9 °F (36.6 °C)   TempSrc: Oral Oral Oral Oral   SpO2: 98% 97% 96% 95%   Weight:    96 kg (211 lb 10.3 oz)   Height:           Flowsheet Rows      Flowsheet Row First Filed Value   Admission Height 182.9 cm (72\") Documented at " 06/07/2024 1524   Admission Weight 92.5 kg (204 lb) Documented at 06/07/2024 1524              Intake/Output Summary (Last 24 hours) at 6/9/2024 0739  Last data filed at 6/8/2024 2130  Gross per 24 hour   Intake --   Output 950 ml   Net -950 ml        TELEMETRY: Sinus rhythm    Physical Exam:  The patient is alert, oriented and in no distress.  Vital signs as noted above.  Head and neck revealed no carotid bruits or jugular venous distention.  No thyromegaly or lymphadenopathy is present  Lungs clear.  No wheezing.  Breath sounds are normal bilaterally.  Heart normal first and second heart sounds.  No murmur. No precordial rub is present.  No gallop is present.  Abdomen soft and nontender.  No organomegaly is present.  Extremities with good peripheral pulses without any pedal edema.  Cardiac cath site looks normal.  Skin warm and dry.  Musculoskeletal system is grossly normal  CNS grossly normal    Reviewed and updated.  Results Review:   I reviewed the patient's new clinical results.  Lab Results (last 24 hours)       Procedure Component Value Units Date/Time    Basic Metabolic Panel [[id number]]  (Abnormal) Collected: 06/09/24 0600    Specimen: Blood Updated: 06/09/24 0628     Glucose 110 mg/dL      BUN 11 mg/dL      Creatinine 0.95 mg/dL      Sodium 137 mmol/L      Potassium 4.1 mmol/L      Comment: Specimen hemolyzed.  Result may be falsely elevated.        Chloride 107 mmol/L      CO2 20.5 mmol/L      Calcium 8.8 mg/dL      BUN/Creatinine Ratio 11.6     Anion Gap 9.5 mmol/L      eGFR 95.1 mL/min/1.73     Narrative:      GFR Normal >60  Chronic Kidney Disease <60  Kidney Failure <15      CBC (No Diff) [[id number]]  (Abnormal) Collected: 06/09/24 0600    Specimen: Blood Updated: 06/09/24 0605     WBC 9.78 10*3/mm3      RBC 3.97 10*6/mm3      Hemoglobin 12.7 g/dL      Hematocrit 39.3 %      MCV 99.0 fL      MCH 32.0 pg      MCHC 32.3 g/dL      RDW 12.6 %      RDW-SD 46.5 fl      MPV 9.3 fL      Platelets 212  10*3/mm3     POC Activated Clotting Time [[id number]]  (Abnormal) Collected: 06/08/24 1044    Specimen: Arterial Blood Updated: 06/08/24 1053     Activated Clotting Time  250 Seconds      Comment: Serial Number: [id number]:  [id number]       POC Activated Clotting Time [[id number]]  (Abnormal) Collected: 06/08/24 1032    Specimen: Arterial Blood Updated: 06/08/24 1053     Activated Clotting Time  256 Seconds      Comment: Serial Number: [id number]:  [id number]               Imaging Results (Last 24 Hours)       ** No results found for the last 24 hours. **        LAB RESULTS (LAST 7 DAYS)    CBC  Results from last 7 days   Lab Units 06/09/24  0600 06/07/24  2259 06/07/24  1533   WBC 10*3/mm3 9.78 10.50 10.29   RBC 10*6/mm3 3.97* 4.04* 4.40   HEMOGLOBIN g/dL 12.7* 13.0 14.1   HEMATOCRIT % 39.3 39.1 42.9   MCV fL 99.0* 96.8 97.5*   PLATELETS 10*3/mm3 212 186 275       BMP  Results from last 7 days   Lab Units 06/09/24  0600 06/08/24  0550 06/07/24  1835 06/07/24  1551   SODIUM mmol/L 137 137  --  136   POTASSIUM mmol/L 4.1 3.9  --  3.7   CHLORIDE mmol/L 107 105  --  101   CO2 mmol/L 20.5* 23.1  --  24.1   BUN mg/dL 11 12  --  19   CREATININE mg/dL 0.95 0.86  --  1.02   GLUCOSE mg/dL 110* 113*  --  96   MAGNESIUM mg/dL  --   --  2.1  --        CMP   Results from last 7 days   Lab Units 06/09/24  0600 06/08/24  0550 06/07/24  1551   SODIUM mmol/L 137 137 136   POTASSIUM mmol/L 4.1 3.9 3.7   CHLORIDE mmol/L 107 105 101   CO2 mmol/L 20.5* 23.1 24.1   BUN mg/dL 11 12 19   CREATININE mg/dL 0.95 0.86 1.02   GLUCOSE mg/dL 110* 113* 96   ALBUMIN g/dL  --   --  4.2   BILIRUBIN mg/dL  --   --  0.2   ALK PHOS U/L  --   --  101   AST (SGOT) U/L  --   --  80*   ALT (SGPT) U/L  --   --  32   LIPASE U/L  --   --  62*         BNP        TROPONIN  Results from last 7 days   Lab Units 06/07/24 2110   HSTROP T ng/L 2,516*       CoAg  Results from last 7 days   Lab Units 06/08/24  0550 06/07/24  2259 06/07/24 2110 06/07/24  1706  06/07/24  1533   INR   --  0.97 0.96 1  --    APTT seconds 94.2* 34.8*  --   --  26.5*       Creatinine Clearance  Estimated Creatinine Clearance: 106.9 mL/min (by C-G formula based on SCr of 0.95 mg/dL).    ABG        Radiology  Adult Transthoracic Echo Complete W/ Cont if Necessary Per Protocol    Addendum Date: 6/8/2024      Left ventricular ejection fraction appears to be 46 - 50%. Indication Chest pain Technically satisfactory study. Mitral valve is structurally normal.  Mild mitral regurgitation is present. Tricuspid valve is structurally normal. Aortic valve is structurally normal. Pulmonic valve could not be well visualized. No evidence for tricuspid or aortic regurgitation is seen by Doppler study. Left atrium is normal in size. Right atrium is normal in size. Left ventricle is normal in size and with apical and periapical severe hypokinesis with estimated left ventricle ejection fraction of 40-45 %. Possible small left ventricular apical thrombus. Left ventricular diastolic function is normal. Left ventricular peak systolic longitudinal strain is abnormal with GL PS of -9%. Right ventricle is normal in size and contractility with TAPSE of 2.7 cm.. Atrial septum is intact. Aorta is normal. No pericardial effusion or intracardiac thrombus is seen. Impression Structurally and functionally normal cardiac valves except for mild mitral regurgitation.. Left ventricle size is normal with apical and periapical severe hypokinesis with estimated left ventricular ejection fraction of 40-45 %.  Possible small apical thrombus. Normal left ventricular diastolic function. Left ventricular peak systolic longitudinal strain is abnormal with GL PS of -9%.    XR Chest 2 View    Result Date: 6/7/2024  Impression: No acute chest finding. Electronically Signed: Park Escoto MD  6/7/2024 4:13 PM EDT  Workstation ID: [id number]         EKG                                            I personally viewed and interpreted the patient's  EKG/Telemetry data: Sinus rhythm poor R wave progression anteriorly.    ECHOCARDIOGRAM:    Results for orders placed during the hospital encounter of 06/07/24    Adult Transthoracic Echo Complete W/ Cont if Necessary Per Protocol    Interpretation Summary    Left ventricular ejection fraction appears to be 46 - 50%.    Indication  Chest pain    Technically satisfactory study.  Mitral valve is structurally normal.  Mild mitral regurgitation is present.  Tricuspid valve is structurally normal.  Aortic valve is structurally normal.  Pulmonic valve could not be well visualized.  No evidence for tricuspid or aortic regurgitation is seen by Doppler study.  Left atrium is normal in size.  Right atrium is normal in size.  Left ventricle is normal in size and with apical and periapical severe hypokinesis with estimated left ventricle ejection fraction of 40-45 %.  Possible small left ventricular apical thrombus.  Left ventricular diastolic function is normal.  Left ventricular peak systolic longitudinal strain is abnormal with GL PS of -9%.  Right ventricle is normal in size and contractility with TAPSE of 2.7 cm..  Atrial septum is intact.  Aorta is normal.  No pericardial effusion or intracardiac thrombus is seen.    Impression  Structurally and functionally normal cardiac valves except for mild mitral regurgitation..  Left ventricle size is normal with apical and periapical severe hypokinesis with estimated left ventricular ejection fraction of 40-45 %.  Possible small apical thrombus.  Normal left ventricular diastolic function.  Left ventricular peak systolic longitudinal strain is abnormal with GL PS of -9%.          STRESS TEST        Cardiolite (Tc-99m sestamibi) stress test    CARDIAC CATHETERIZATION  Results for orders placed during the hospital encounter of 06/07/24    Cardiac Catheterization/Vascular Study    Conclusion  Diagnostic cardiac catheterization was performed by Dr. James which is dictated  separately.    I performed the following procedures:    #1 PCI with LC placement to LAD for acute MI 17388  #2 intravascular ultrasound of LAD 41233  #3 Common femoral angiogram and Angio-Seal deployment    Procedure in details  5 [place] introducer sheath was exchanged with 10 cm 6 [place] introducer sheath.  100 units/kg of heparin was administered and ACT of more than 250 was documented.  A 6 [place]er XB LAD 3.5 guide catheter was used to engage the ostium of left main coronary artery.  A 0.014 run-through wire was then advanced into the distal/apical LAD.  I then used a 2.5 x 12 mm compliant balloon to perform predilatation of this lesion.  Pre-PCI lesion description: 99% thrombotic mid LAD occlusion with ELIZABETH II flow.  Lesion length~20 mm.  Type C due to involvement of 2 diagonal arteries.  I then advanced intravascular ultrasound catheter onto the run-through wire into the mid LAD and performed a slow manual pullback.  Mid LAD measured 2.7 x 3.1 mm in dimension and proximal LAD measured 3.7 x 4 mm in dimension.  Thrombus was confirmed at the site of lesion and proximal LAD also had mild atherosclerotic plaque.  I then placed a 3 x 28 mm Xience luis point stent which was deployed at 14 crystal.  This was postdilated with 3.5 X 15 mm noncompliant balloon at 14 crystal.  Final angiography showed ELIZABETH-3 flow in both the diagonals and LAD with 0% residual stenosis.    Conclusion  Successful IVUS guided PCI for acute MI LAD with LC placement    Recommendations  Start dual antiplatelet therapy  Start high intensity statin, beta-blocker and ACE inhibitor  Cardiac rehab referral.    Electronically signed by Sher Solorzano MD, 06/08/24, 11:03 AM EDT.                OTHER:         Assessment & Plan     Principal Problem:    ACS (acute coronary syndrome)  Active Problems:    Acute transmural myocardial infarct anterior wall, initial hospitaliz    Acute coronary syndrome with high troponin    Alcohol-induced acute pancreatitis     Alcohol abuse    Hypercholesterolemia      ]]]]]]]]]]]]]]]]]]]]]]]  Impression  ==============  -Unstable angina  Non-STEMI.  Troponin [phone]    Cardiac catheterization 6/8/2024.  Left ventricular angiogram revealed left ventricular apical hypokinesis with ejection fraction of 50 to 55%.  1-2 placed mitral regurgitation is present.     Left main coronary artery is normal.  Left anterior descending artery has 99% disease with probable clot between the first and second diagonal branches.  Circumflex coronary artery is a large and codominant vessel and is normal.  Right coronary artery is a codominant vessel and is normal.      Echocardiogram 6/8/2024.  Structurally and functionally normal cardiac valves except for mild mitral regurgitation..  Left ventricle size is normal with apical and periapical severe hypokinesis with estimated left ventricular ejection fraction of 40-45 %.  Possible small apical thrombus.  Normal left ventricular diastolic function.  Left ventricular peak systolic longitudinal strain is abnormal with GL PS of -9%.     - Dyslipidemia  Cholesterol 235  VLDL 67  Start high-intensity statin.     - History of excessive alcohol consumption.  Patient apparently drinks 6 beers a day.     - History of hand surgery and tonsillectomy.     - Family history of coronary artery disease     - Former smoker.     - No known allergies     =================  Plan  =================  Unstable angina/non-STEMI.  Cardiac catheterization 6/8/2024-as above  Status post stent to LAD 6/8/2024-as above    Modification of risk factors.  Hopefully left ventricle function would improve with interventional revascularization.    Patient had vasovagal reaction yesterday and improved quickly with fluids and IV atropine.    Patient is not having any angina pectoris or congestive heart failure.    Dyslipidemia-on statins.    Significant dyslipidemia-on statin.    Medications were reviewed and updated.    Current  medications include aspirin atorvastatin 80 mg p.o. nightly lisinopril 5 mg a day metoprolol XL 25 mg a day Brilinta 90 mg twice daily.    Patient and patient's family were explained about importance of taking Brilinta for at least 1 year without interruption.    Follow-up in the office in 2 to 3 weeks.    Further plan will depend on patient's progress.    Reviewed and updated 2024.  ]]]]]]]]]]]]]]]]]]]]]]]]]                 Pierre James MD  24  07:39 EDT                  Electronically signed by Pierer James MD at 24 1105              Discharge Summary        Salinas Santiago MD at 24 1204                       [facility] Medicine Services  Discharge Summary    Date of Service: 24  Patient Name: Logan Huitron  : 1970  MRN: [id number]    Date of Admission: 2024  Discharge Diagnosis: #ACS with an NSTEMI  Date of Discharge:  24  Primary Care Physician: Mikey Keller MD      Presenting Problem:   Alcohol abuse [F10.10]  Hypercholesterolemia [E78.00]  ACS (acute coronary syndrome) [I24.9]  Acute transmural myocardial infarct anterior wall, initial hospitaliz [I21.09]  Alcohol-induced acute pancreatitis, unspecified complication status [K85.20]  Acute coronary syndrome with high troponin [I24.9]    Active and Resolved Hospital Problems:  Active Hospital Problems    Diagnosis POA    **ACS (acute coronary syndrome) [I24.9] Yes    Acute transmural myocardial infarct anterior wall, initial hospitaliz [I21.09] Unknown    Acute coronary syndrome with high troponin [I24.9] Unknown    Alcohol-induced acute pancreatitis [K85.20] Unknown    Alcohol abuse [F10.10] Unknown    Hypercholesterolemia [E78.00] Unknown      Resolved Hospital Problems   No resolved problems to display.       #ACS with an NSTEMI  Presented with chest pain  - Elevated troponin trending up  - EKG NSR with slight ST-T wave changes on V2-V5  - Started on aspirin statin on heparin drip monitor for  toxicity  - Cardiology team consulted for cardiac cath s/p LC   LV angiogram left ventricular hypokinesis with EF of 50-55% status post PCI showing acute MI LAD with LC placement.  Recommended DAPT, high intensity statin, beta-blocker and ACE inhibitor as well as Cardiografin follow-up and discharged  -echoCardiogram  F 46-50% mild MR  -Cardiac rehab on discharge     #Hyperlipidemia  - Started on statin       Hospital Course     HPI: Logan Huitron is a 53 y.o. male who presented to  for chest pain.  Admits to episodic 4/10 chest pain ongoing for the past two days that suddenly worsened while he was playing golf today.  Denies numbness, chills, fevers, dyspnea, vomiting, diarrhea.  Presented to  for evaluation.       ED course: At , labs 98F, HR 84, /90, RR 16, 98% RA.  Labs notable for HS troponin 662, AST 80, lipase 62.  EKG NSR with slight ST changes in V2-V5. CXR without acute cardiopulmonary process.  Cardiology contacted, patient started on heparin drip. Transferred to [facility] for cardiac evaluation.     Patient was seen and examined on 06/07/24 at 21:04 EDT .  Patient underwent Cardiac catheterization 6/8/2024.  Left ventricular angiogram revealed left ventricular apical hypokinesis with ejection fraction of 50 to 55%.  1-2 placed mitral regurgitation is present.  Left main coronary artery is normal.  Left anterior descending artery has 99% disease with probable clot between the first and second diagonal branches.  Circumflex coronary artery is a large and codominant vessel and is normal.  Right coronary artery is a codominant vessel and is normal.      Echocardiogram 6/8/2024.  Structurally and functionally normal cardiac valves except for mild mitral regurgitation..  Left ventricle size is normal with apical and periapical severe hypokinesis with estimated left ventricular ejection fraction of 40-45 %.  Possible small apical thrombus.  Normal left ventricular diastolic function.  Left ventricular  peak systolic longitudinal strain is abnormal with GL PS of -9%    6/9/24 doing better today, will dc home, condition on dc stable.    DISCHARGE Follow Up Recommendations for labs and diagnostics: follow with pcp in one week      Reasons For Change In Medications and Indications for New Medications:  START taking:  aspirin   Start taking on: Helne 10, 2024  atorvastatin (LIPITOR)   lisinopril (PRINIVIL,ZESTRIL)   Start taking on: Helen 10, 2024  metoprolol succinate XL (TOPROL-XL)   Start taking on: Helen 10, 2024  nitroglycerin (NITROSTAT)   ticagrelor (BRILINTA)     Day of Discharge     Vital Signs:  Temp:  [97.9 °F (36.6 °C)-98.6 °F (37 °C)] 98.2 °F (36.8 °C)  Heart Rate:  [] 91  Resp:  [12-22] 22  BP: ()/() 116/75    Physical Exam:  Physical Exam      Mouth: Mucous membranes are moist.   Eyes:      Pupils: Pupils are equal, round, and reactive to light.   Cardiovascular:      Pulses: Normal pulses.      Heart sounds: Normal heart sounds.   Pulmonary:      Breath sounds: Normal breath sounds.   Abdominal:      General: Abdomen is flat.      Palpations: Abdomen is soft.   Musculoskeletal:         General: Normal range of motion.      Cervical back: Neck supple.   Skin:     Comments: Rt groin dressing with minimal bleeding    Neurological:      General: No focal deficit present.      Mental Status: He is alert.   Psychiatric:         Mood and Affect: Mood normal.          Pertinent  and/or Most Recent Results     LAB RESULTS:      Lab 06/09/24  0600 06/08/24  0550 06/07/24  2259 06/07/24  2110 06/07/24  1706 06/07/24  1533   WBC 9.78  --  10.50  --   --  10.29   HEMOGLOBIN 12.7*  --  13.0  --   --  14.1   HEMATOCRIT 39.3  --  39.1  --   --  42.9   PLATELETS 212  --  186  --   --  275   NEUTROS ABS  --   --  7.52*  --   --  7.18*   IMMATURE GRANS (ABS)  --   --  0.03  --   --  0.01   LYMPHS ABS  --   --  1.81  --   --  1.97   MONOS ABS  --   --  1.06*  --   --  1.00*   EOS ABS  --   --  0.04  --   --   0.09   MCV 99.0*  --  96.8  --   --  97.5*   PROTIME  --   --  10.6 10.5 11.0  --    APTT  --  94.2* 34.8*  --   --  26.5*         Lab 06/09/24  0600 06/08/24  0550 06/07/24 2259 06/07/24 2110 06/07/24  1835 06/07/24  1551   SODIUM 137 137  --   --   --  136   POTASSIUM 4.1 3.9  --   --   --  3.7   CHLORIDE 107 105  --   --   --  101   CO2 20.5* 23.1  --   --   --  24.1   ANION GAP 9.5 8.9  --   --   --  10.9   BUN 11 12  --   --   --  19   CREATININE 0.95 0.86  --   --   --  1.02   EGFR 95.1 102.9  --   --   --  87.3   GLUCOSE 110* 113*  --   --   --  96   CALCIUM 8.8 8.7  --   --   --  9.5   MAGNESIUM  --   --   --   --  2.1  --    HEMOGLOBIN A1C  --   --  5.42  --   --   --    TSH  --   --   --  3.230  --   --          Lab 06/07/24  1551   TOTAL PROTEIN 6.0   ALBUMIN 4.2   GLOBULIN 1.8   ALT (SGPT) 32   AST (SGOT) 80*   BILIRUBIN 0.2   ALK PHOS 101   LIPASE 62*         Lab 06/07/24 2259 06/07/24 2110 06/07/24 1835 06/07/24  1706 06/07/24  1551   PROBNP  --   --  665.7  --   --    HSTROP T  --  2,516* 2,156*  --  662*   PROTIME 10.6 10.5  --  11.0  --    INR 0.97 0.96  --  1  --          Lab 06/07/24 2110   CHOLESTEROL 235*   LDL CHOL 133*   HDL CHOL 35*   TRIGLYCERIDES 369*             Brief Urine Lab Results       None          Microbiology Results (last 10 days)       ** No results found for the last 240 hours. **            Adult Transthoracic Echo Complete W/ Cont if Necessary Per Protocol    Addendum Date: 6/8/2024      Left ventricular ejection fraction appears to be 46 - 50%. Indication Chest pain Technically satisfactory study. Mitral valve is structurally normal.  Mild mitral regurgitation is present. Tricuspid valve is structurally normal. Aortic valve is structurally normal. Pulmonic valve could not be well visualized. No evidence for tricuspid or aortic regurgitation is seen by Doppler study. Left atrium is normal in size. Right atrium is normal in size. Left ventricle is normal in size and with  apical and periapical severe hypokinesis with estimated left ventricle ejection fraction of 40-45 %. Possible small left ventricular apical thrombus. Left ventricular diastolic function is normal. Left ventricular peak systolic longitudinal strain is abnormal with GL PS of -9%. Right ventricle is normal in size and contractility with TAPSE of 2.7 cm.. Atrial septum is intact. Aorta is normal. No pericardial effusion or intracardiac thrombus is seen. Impression Structurally and functionally normal cardiac valves except for mild mitral regurgitation.. Left ventricle size is normal with apical and periapical severe hypokinesis with estimated left ventricular ejection fraction of 40-45 %.  Possible small apical thrombus. Normal left ventricular diastolic function. Left ventricular peak systolic longitudinal strain is abnormal with GL PS of -9%.    XR Chest 2 View    Result Date: 6/7/2024  Impression: Impression: No acute chest finding. Electronically Signed: Park Escoto MD  6/7/2024 4:13 PM EDT  Workstation ID: [id number]             Results for orders placed during the hospital encounter of 06/07/24    Adult Transthoracic Echo Complete W/ Cont if Necessary Per Protocol    Interpretation Summary    Left ventricular ejection fraction appears to be 46 - 50%.    Indication  Chest pain    Technically satisfactory study.  Mitral valve is structurally normal.  Mild mitral regurgitation is present.  Tricuspid valve is structurally normal.  Aortic valve is structurally normal.  Pulmonic valve could not be well visualized.  No evidence for tricuspid or aortic regurgitation is seen by Doppler study.  Left atrium is normal in size.  Right atrium is normal in size.  Left ventricle is normal in size and with apical and periapical severe hypokinesis with estimated left ventricle ejection fraction of 40-45 %.  Possible small left ventricular apical thrombus.  Left ventricular diastolic function is normal.  Left ventricular peak  systolic longitudinal strain is abnormal with GL PS of -9%.  Right ventricle is normal in size and contractility with TAPSE of 2.7 cm..  Atrial septum is intact.  Aorta is normal.  No pericardial effusion or intracardiac thrombus is seen.    Impression  Structurally and functionally normal cardiac valves except for mild mitral regurgitation..  Left ventricle size is normal with apical and periapical severe hypokinesis with estimated left ventricular ejection fraction of 40-45 %.  Possible small apical thrombus.  Normal left ventricular diastolic function.  Left ventricular peak systolic longitudinal strain is abnormal with GL PS of -9%.      Labs Pending at Discharge:      Procedures Performed  Procedure(s):  Left Heart Cath and coronary angiogram  Percutaneous Coronary Intervention  Intravascular Ultrasound         Consults:   Consults       Date and Time Order Name Status Description    6/7/2024  8:50 PM Inpatient Cardiology Consult            ssessment & Plan  Principal Problem:    ACS (acute coronary syndrome)  Active Problems:    Acute transmural myocardial infarct anterior wall, initial hospitaliz    Acute coronary syndrome with high troponin    Alcohol-induced acute pancreatitis    Alcohol abuse    Hypercholesterolemia        ]]]]]]]]]]]]]]]]]]]]]]]  Impression  ==============  -Unstable angina  Non-STEMI.  Troponin 662 2156 2516     Cardiac catheterization 6/8/2024.  Left ventricular angiogram revealed left ventricular apical hypokinesis with ejection fraction of 50 to 55%.  1-2 placed mitral regurgitation is present.     Left main coronary artery is normal.  Left anterior descending artery has 99% disease with probable clot between the first and second diagonal branches.  Circumflex coronary artery is a large and codominant vessel and is normal.  Right coronary artery is a codominant vessel and is normal.      Echocardiogram 6/8/2024.  Structurally and functionally normal cardiac valves except for mild  mitral regurgitation..  Left ventricle size is normal with apical and periapical severe hypokinesis with estimated left ventricular ejection fraction of 40-45 %.  Possible small apical thrombus.  Normal left ventricular diastolic function.  Left ventricular peak systolic longitudinal strain is abnormal with GL PS of -9%.     - Dyslipidemia  Cholesterol 235  VLDL 67  Start high-intensity statin.     - History of excessive alcohol consumption.  Patient apparently drinks 6 beers a day.     - History of hand surgery and tonsillectomy.     - Family history of coronary artery disease     - Former smoker.     - No known allergies     =================  Plan  =================  Unstable angina/non-STEMI.  Cardiac catheterization 6/8/2024-as above  Status post stent to LAD 6/8/2024-as above     Modification of risk factors.  Hopefully left ventricle function would improve with interventional revascularization.     Patient had vasovagal reaction yesterday and improved quickly with fluids and IV atropine.     Patient is not having any angina pectoris or congestive heart failure.     Dyslipidemia-on statins.     Significant dyslipidemia-on statin.     Medications were reviewed and updated.     Current medications include aspirin atorvastatin 80 mg p.o. nightly lisinopril 5 mg a day metoprolol XL 25 mg a day Brilinta 90 mg twice daily.     Patient and patient's family were explained about importance of taking Brilinta for at least 1 year without interruption.     Follow-up in the office in 2 to 3 weeks.     Further plan will depend on patient's progress.     Reviewed and updated 6/9/2024.  ]]]]]]]]]]]]]]]]]]]]]]]]]                    Pierre James MD  06/09/24    Discharge Details        Discharge Medications        New Medications        Instructions Start Date   aspirin 81 MG EC tablet   81 mg, Oral, Daily   Start Date: Helen 10, 2024     atorvastatin 80 MG tablet  Commonly known as: LIPITOR   80 mg, Oral, Nightly       lisinopril 5 MG tablet  Commonly known as: PRINIVIL,ZESTRIL   5 mg, Oral, Every 24 Hours Scheduled   Start Date: Helen 10, 2024     metoprolol succinate XL 25 MG 24 hr tablet  Commonly known as: TOPROL-XL   25 mg, Oral, Every 24 Hours Scheduled   Start Date: Helen 10, 2024     nitroglycerin 0.4 MG SL tablet  Commonly known as: NITROSTAT   0.4 mg, Sublingual, Every 5 Minutes PRN, Take no more than 3 doses in 15 minutes.      ticagrelor 90 MG tablet tablet  Commonly known as: BRILINTA   90 mg, Oral, 2 Times Daily             Continue These Medications        Instructions Start Date   acetaminophen 325 MG tablet  Commonly known as: TYLENOL   650 mg, Oral, Every 6 Hours PRN               No Known Allergies      Discharge Disposition:   Home or Self Care    Diet:  Hospital:  Diet Order   Procedures    Diet: Cardiac; Healthy Heart (2-3 Na+); Fluid Consistency: Thin (IDDSI 0)         Discharge Activity:   Activity Instructions       Gradually Increase Activity Until at Pre-Hospitalization Level                CODE STATUS:  Code Status and Medical Interventions:   Ordered at: 06/07/24 2049     Code Status (Patient has no pulse and is not breathing):    CPR (Attempt to Resuscitate)     Medical Interventions (Patient has pulse or is breathing):    Full Support         Future Appointments   Date Time Provider Department Center   12/18/2024  8:15 AM Mikey Keller MD [facility]       Additional Instructions for the Follow-ups that You Need to Schedule       Discharge Follow-up with PCP   As directed       Currently Documented PCP:    Mikey Keller MD    PCP Phone Number:    [phone]     Follow Up Details: 1 week        Discharge Follow-up with Specified Provider: cardiology; 2 Weeks   As directed      To: cardiology   Follow Up: 2 Weeks                Time spent on Discharge including face to face service:  >30 minutes    Signature: Electronically signed by Salinas Santiago MD, 06/09/24, 12:04  EDT.  [facility] Hospitalist Team    Electronically signed by Salinas Santiago MD at 06/09/24 9577

## 2024-06-10 NOTE — ADDENDUM NOTE
Addended by: ANNA MARIE JIANG on: 6/10/2024 04:04 PM     Modules accepted: Orders     Spironolactone Counseling: Patient advised regarding risks of diarrhea, abdominal pain, hyperkalemia, birth defects (for female patients), liver toxicity and renal toxicity. The patient may need blood work to monitor liver and kidney function and potassium levels while on therapy. The patient verbalized understanding of the proper use and possible adverse effects of spironolactone.  All of the patient's questions and concerns were addressed.

## 2024-06-10 NOTE — OUTREACH NOTE
Call Center TCM Note      Flowsheet Row Responses   [facility] patient discharged from? [facility]   Does the patient have one of the following disease processes/diagnoses(primary or secondary)? Acute MI (STEMI,NSTEMI)   TCM attempt successful? Yes  [no names listed on verbal release]   Call start time 0909   Call end time 0915   Discharge diagnosis ACS (acute coronary syndrome)   Medication alerts for this patient BRILINTA   Meds reviewed with patient/caregiver? Yes   Is the patient having any side effects they believe may be caused by any medication additions or changes? No   Does the patient have all prescriptions related to this admission filled (includes statins,anticoagulants,HTN meds,anti-arrhythmia meds) No   What is keeping the patient from filling the prescriptions? --  [Pharmacy did not have lisinopril, metoprolol or brilinita but should have today.]   Nursing Interventions Nurse provided patient education  [Advised pt to have prescriptions transferred to another pharmacy if prescriptions not available, discussed importance of brilinta due to LC]   Prescription comments Pt will  OTC ASA   Is the patient taking all medications as directed (includes completed medication regime)? Yes   Comments Hospital f/u on 6/14/24@100pm   Does the patient have an appointment with their PCP within 7-14 days of discharge? Yes   Has home health visited the patient within 72 hours of discharge? N/A   Psychosocial issues? No   Did the patient receive a copy of their discharge instructions? Yes   Nursing interventions Reviewed instructions with patient   What is the patient's perception of their health status since discharge? Improving  [Pt denies chest pain and offers complaints today, aware to use NTG prn. Advised to check BP readings and bring to PCP and cardio f/u appts.  Pt has no questions or concerns today]   Nursing interventions Nurse provided patient education   Is the patient/caregiver able to teach back  signs and symptoms of when to call for help immediately: Sudden chest discomfort   Nursing interventions Nurse provided patient education   Is the pateint /caregiver able to teach back the importance of cardiac rehab? Yes   Nursing interventions Referral made to cardiac rehab  [Pt reports he will start his own walking exercise program and will defer cardiac rehab]   Is the patient/caregiver able to teach back lifestyle changes to help prevent MIs Regular exercise as approved by provider, Heart healthy diet, Reducing stress, Limiting alcohol intake  [plans on etoh cessation]   Is the patient/caregiver able to teach back ways to prevent a second heart attack: Take medications, Manage risk factors, Follow up with MD   If the patient is a current smoker, are they able to teach back resources for cessation? Not a smoker   Is the patient/caregiver able to teach back the hierarchy of who to call/visit for symptoms/problems? PCP, Specialist, Home health nurse, Urgent Care, ED, 911 Yes   TCM call completed? Yes   Call end time 0915            Ghazala Yoon RN    6/10/2024, 09:25 EDT

## 2024-06-10 NOTE — TELEPHONE ENCOUNTER
Rx Refill Note  Requested Prescriptions     Pending Prescriptions Disp Refills    clopidogrel (PLAVIX) 75 MG tablet 90 tablet 3     Sig: Take 1 tablet by mouth Daily.      Last office visit with prescribing clinician: Visit date not found   Last telemedicine visit with prescribing clinician: Visit date not found   Next office visit with prescribing clinician: Visit date not found                         Would you like a call back once the refill request has been completed: [] Yes [] No    If the office needs to give you a call back, can they leave a voicemail: [] Yes [] No    Neema Moore MA  06/10/24, 16:03 EDT

## 2024-06-11 LAB
QT INTERVAL: 374 MS
QTC INTERVAL: 405 MS

## 2024-06-14 ENCOUNTER — OFFICE VISIT (OUTPATIENT)
Dept: FAMILY MEDICINE CLINIC | Facility: CLINIC | Age: 54
End: 2024-06-14
Payer: COMMERCIAL

## 2024-06-14 VITALS
WEIGHT: 202.4 LBS | OXYGEN SATURATION: 99 % | HEART RATE: 70 BPM | HEIGHT: 72 IN | SYSTOLIC BLOOD PRESSURE: 118 MMHG | DIASTOLIC BLOOD PRESSURE: 64 MMHG | RESPIRATION RATE: 18 BRPM | BODY MASS INDEX: 27.41 KG/M2

## 2024-06-14 DIAGNOSIS — I25.10 CORONARY ARTERY DISEASE INVOLVING NATIVE CORONARY ARTERY OF NATIVE HEART WITHOUT ANGINA PECTORIS: Primary | ICD-10-CM

## 2024-06-14 DIAGNOSIS — I21.4 NSTEMI (NON-ST ELEVATED MYOCARDIAL INFARCTION): ICD-10-CM

## 2024-06-14 PROCEDURE — 99495 TRANSJ CARE MGMT MOD F2F 14D: CPT | Performed by: INTERNAL MEDICINE

## 2024-06-14 NOTE — PROGRESS NOTES
Transitional Care Follow Up Visit  Subjective     Logan Huitron is a 53 y.o. male who presents for a transitional care management visit.    Within 48 business hours after discharge our office contacted him via telephone to coordinate his care and needs.      I reviewed and discussed the details of that call along with the discharge summary, hospital problems, inpatient lab results, inpatient diagnostic studies, and consultation reports with Logan.     Current outpatient and discharge medications have been reconciled for the patient.  Reviewed by: Mikey Keller MD          6/9/2024     1:25 PM   Date of TCM Phone Call   [facility]   Date of Admission 6/7/2024   Date of Discharge 6/9/2024   Discharge Disposition Home or Self Care     Risk for Readmission (LACE) Score: 6 (6/9/2024  6:00 AM)      History of Present Illness   Course During Hospital Stay:      Patient recently admitted from 6/7/2024 until 6/9/2024 for acute coronary syndrome with non-ST elevation MI.  Patient presented to outside hospital with 2 days of ongoing chest pain.  At outside ED, HR 84, /90, RR 16, 98% RA. Labs notable for HS troponin 662, AST 80, lipase 62. EKG NSR with slight ST changes in V2-V5. CXR without acute cardiopulmonary process. Cardiology contacted, patient started on heparin drip. Transferred to [facility] for cardiac evaluation.     Patient underwent cardiac catheterization on 6/8/2024 which showed obstructed left anterior descending artery with apical hypokinesis with EF of 50 to 55%.  Patient underwent PCI with LC placement to LAD with successful reperfusion.  Patient tolerated procedure well without postprocedural complications.  He was started on aspirin 81 mg daily, atorvastatin 80 mg daily, lisinopril 5 mg daily, metoprolol XL 25 mg daily, Plavix 75 mg nightly, and nitroglycerin as needed.  Patient discharged with recommended follow-up with PCP and cardiology as outpatient.    Since discharge, patient  "overall doing well. He has not had recurrent chest pain or discomfort since PCI and LC. Denies chest pain, shortness of breath, orthopnea, PND, and lower extremity edema. Denies palpitations, tachycardia, dizziness, lightheadedness, and syncope. Right groin site well healing. Still has some swelling but pain improvement. Denies erythema, swelling, warmth, fever, chills. Compliant with medication and tolerating well. Home blood pressure has been overall really good.        The following portions of the patient's history were reviewed and updated as appropriate: allergies, current medications, past family history, past medical history, past social history, past surgical history, and problem list.    Review of Systems  Review of systems negative unless otherwise noted in HPI.    Objective   /64   Pulse 70   Resp 18   Ht 182.9 cm (72.01\")   Wt 91.8 kg (202 lb 6.4 oz)   SpO2 99%   BMI 27.44 kg/m²   Physical Exam  General: Well-appearing patient, no apparent distress  Cardiac: Regular rate and rhythm, normal S1/S2, no murmur, rubs or gallops, no lower extremity edema  Lungs: Clear to auscultation bilaterally, no crackles or wheezes  Skin: Significant bruising of the right groin site without evidence of erythema, swelling, or warmth  MSK: Grossly normal tone and strength  Neuro: Alert and oriented x3, CN II-XII grossly intact  Psych: Appropriate mood and affect      Assessment & Plan        (I25.10) Coronary artery disease involving native coronary artery of native heart without angina pectoris  (I21.4) NSTEMI (non-ST elevated myocardial infarction)  Assessment: Patient overall doing well after PCI with LC to LAD.  Chest pain resolved without recurrent symptoms.  Groin site well-healing with bruising without evidence of infection.  Patient tolerating medications well without significant side effects.  Discussed the importance of medication compliance, follow-up, and modifying cardiac risk factors.  Plan:  - " Continue current medications as prescribed  - Monitor and record home blood pressures   - Encourage healthy diet and activity per cardiology  - Monitor groin site for evidence of infection  - Follow up with cardiology as scheduled    Dr Mikey Keller   Internal Medicine Physician  [facility]  [address], [address]  [place] [address]

## 2024-06-14 NOTE — PATIENT INSTRUCTIONS
Up to date with labs    Medications:  Continue current medications as prescribed    Monitor and record home blood pressures     Encourage healthy diet and activity per cardiology    Follow up with cardiology as scheduled    Follow up in 6 months for preventative care visit or sooner if something arises

## 2024-06-15 NOTE — PROGRESS NOTES
"Enter Query Response Below      Query Response: Acute transmural myocardial infarct of anterior wall Electronically signed by Salinas Santiago MD, 24, 10:05 PM EDT.              If applicable, please update the problem list.   Patient: Logan Huitron        : 1970  Account: [id number]           Admit Date: 2024        How to Respond to this query:       a. Click New Note     b. Answer query within the yellow box.                c. Update the Problem List, if applicable.      If you have any questions about this query contact me at: [email or web address]      and Dr. Santiago:    53-year-old patient admitted 2024 with diagnosis of NSTEMI per H&P and Cardiology Consult Note.   \"Acute transmural myocardial infarct of anterior wall, initial hospitaliz\" and \"NSTEMI\" are both included in Discharge Summary,  Cardiology Progress Note, and Cardiac Cath report. ED Provider Note includes \"Acute transmural myocardial infarct of anterior wall, initial hospitaliz\".   Per Cardiac Catheterization report, \"Left anterior descending artery has 99% disease with probable clot between the first and second diagonal branches.\" Cardiac catheterization report with description of intervention states \"Mid LAD measured 2.7 x 3.1 mm in dimension and proximal LAD measured 3.7 x 4 mm in dimension.  Thrombus was confirmed at the site of lesion and proximal LAD also had mild atherosclerotic plaque\".  24 15:51-HS Troponin T: 662  24 18:35-HS Troponin T: 2,156; Troponin T Delta: 1,494   24 21:10-HS Troponin T: 2,516  Per Cardiology Consult Note, \"I personally viewed and interpreted the patient's EKG/Telemetry data: Sinus rhythm poor R wave progression anteriorly suggestive of anteroseptal infarction age undetermined.  ST-T wave abnormalities-stable.\"  Treatment has included LC to LAD, Aspirin, NS 1000 ml IV bolus, Heparin drip, Toprol XL, Nitroglycerin SL, and Brilinta.    Please clarify " conflicting documentation as:    Acute transmural myocardial infarct of anterior wall  NSTEMI  Other- specify__________  Unable to determine      By submitting this query, we are merely seeking further clarification of documentation to accurately reflect all conditions that you are monitoring, evaluating, treating or that extend the hospitalization or utilize additional resources of care. Please utilize your independent clinical judgment when addressing the question(s) above.     This query and your response, once completed, will be entered into the legal medical record.    Sincerely,  Munira Garrido RN, BSN, CCDS  Clinical Documentation Integrity Program

## 2024-06-20 ENCOUNTER — READMISSION MANAGEMENT (OUTPATIENT)
Dept: CALL CENTER | Facility: HOSPITAL | Age: 54
End: 2024-06-20
Payer: COMMERCIAL

## 2024-06-20 NOTE — OUTREACH NOTE
[facility] Week 1 Survey      Flowsheet Row Responses   [facility] facility patient discharged from? [facility]   Does the patient have one of the following disease processes/diagnoses(primary or secondary)? Acute MI (STEMI,NSTEMI)            Lucinda ALFRED - Licensed Nurse

## 2024-07-01 NOTE — PROGRESS NOTES
Encounter Date:07/03/2024  Last seen 6/9/2024      Patient ID: Logan Huitron is a 53 y.o. male.    Chief complaint  Status post stent  Dyslipidemia    History of present illness  Since I have last seen, the patient has been without any chest discomfort ,shortness of breath, palpitations, dizziness or syncope.  Denies having any headache ,abdominal pain ,nausea, vomiting , diarrhea constipation, loss of weight or loss of appetite.  Denies having any excessive bruising ,hematuria or blood in the stool.    Review of all systems negative except as indicated.    Reviewed ROS.    Assessment and plan  ]]]]]]]]]]]]]]]]]]]]]]  Impression  ==============  - Status post stent to LAD 6/8/2024    -Unstable angina  Non-STEMI.-Prior to stent placement  Troponin 662 [id number] [id number]     Cardiac catheterization 6/8/2024.  Left ventricular angiogram revealed left ventricular apical hypokinesis with ejection fraction of 50 to 55%.  1-2 placed mitral regurgitation is present.     Left main coronary artery is normal.  Left anterior descending artery has 99% disease with probable clot between the first and second diagonal branches.  Circumflex coronary artery is a large and codominant vessel and is normal.  Right coronary artery is a codominant vessel and is normal.      Echocardiogram 6/8/2024.  Structurally and functionally normal cardiac valves except for mild mitral regurgitation..  Left ventricle size is normal with apical and periapical severe hypokinesis with estimated left ventricular ejection fraction of 40-45 %.  Possible small apical thrombus.  Normal left ventricular diastolic function.  Left ventricular peak systolic longitudinal strain is abnormal with GL PS of -9%.     - Dyslipidemia  Cholesterol 235  VLDL 67  Start high-intensity statin.     - History of excessive alcohol consumption.  Patient apparently drinks 6 beers a day.     - History of hand surgery and tonsillectomy.     - Family history of coronary artery  disease     - Former smoker.     - No known allergies     =================  Plan  =================  Status post stent to LAD 6/8/2024  Unstable angina and non-STEMI prior to stent placement.    Patient is not having any angina pectoris or congestive heart failure.    Left ventricular dysfunction  Hopefully left ventricle function would improve with interventional revascularization.     Dyslipidemia-on statins.    Antiplatelet therapy was discussed.  Take Plavix without disruption for at least 1 year.  Brilinta is too expensive.      Medications were reviewed and updated.     Current medications include aspirin atorvastatin 80 mg p.o. nightly lisinopril 5 mg a day metoprolol XL 25 mg a day Brilinta 90 mg twice daily.    Follow-up in the office in 3 months with EKG.    Further plan will depend on patient's progress.     Reviewed and updated-7/3/2024  ]]]]]]]]]]]]]]]]]]]]]]]]]          Diagnosis Plan   1. Acute transmural myocardial infarct anterior wall, initial hospitaliz        2. Acute coronary syndrome with high troponin        3. Alcohol abuse        4. Hypercholesterolemia        5. Coronary artery disease involving native coronary artery of native heart without angina pectoris        6. ACS (acute coronary syndrome)        LAB RESULTS (LAST 7 DAYS)    CBC        BMP        CMP         BNP        TROPONIN        CoAg        Creatinine Clearance  Estimated Creatinine Clearance: 113.5 mL/min (by C-G formula based on SCr of 0.95 mg/dL).    ABG        Radiology  No radiology results for the last day                The following portions of the patient's history were reviewed and updated as appropriate: allergies, current medications, past family history, past medical history, past social history, past surgical history, and problem list.    Review of Systems   Constitutional: Negative for malaise/fatigue.   Cardiovascular:  Negative for chest pain, leg swelling, palpitations and syncope.   Respiratory:  Negative  for shortness of breath.    Skin:  Negative for rash.   Gastrointestinal:  Negative for nausea and vomiting.   Neurological:  Negative for dizziness, light-headedness and numbness.   All other systems reviewed and are negative.      Current Outpatient Medications:     acetaminophen (TYLENOL) 325 MG tablet, Take 2 tablets by mouth Every 6 (Six) Hours As Needed for Mild Pain., Disp: , Rfl:     aspirin 81 MG EC tablet, Take 1 tablet by mouth Daily., Disp: 30 tablet, Rfl: 0    atorvastatin (LIPITOR) 80 MG tablet, Take 1 tablet by mouth Every Night., Disp: 90 tablet, Rfl: 3    clopidogrel (PLAVIX) 75 MG tablet, Take 1 tablet by mouth Daily., Disp: 90 tablet, Rfl: 3    lisinopril (PRINIVIL,ZESTRIL) 5 MG tablet, Take 1 tablet by mouth Daily., Disp: 30 tablet, Rfl: 0    metoprolol succinate XL (TOPROL-XL) 25 MG 24 hr tablet, Take 1 tablet by mouth Daily., Disp: 30 tablet, Rfl: 3    nitroglycerin (NITROSTAT) 0.4 MG SL tablet, Place 1 tablet under the tongue Every 5 (Five) Minutes As Needed for Chest Pain (Systolic BP Greater Than 100). Take no more than 3 doses in 15 minutes., Disp: 30 tablet, Rfl: 12    No Known Allergies    Family History   Problem Relation Age of Onset    No Known Problems Mother     No Known Problems Father        Past Surgical History:   Procedure Laterality Date    CARDIAC CATHETERIZATION N/A 6/8/2024    Procedure: Left Heart Cath and coronary angiogram;  Surgeon: Pierre James MD;  Location: [facility] CATH INVASIVE LOCATION;  Service: Cardiovascular;  Laterality: N/A;    CARDIAC CATHETERIZATION N/A 6/8/2024    Procedure: Percutaneous Coronary Intervention;  Surgeon: Sher Solorzano MD;  Location: [facility] CATH INVASIVE LOCATION;  Service: Cardiovascular;  Laterality: N/A;    HAND SURGERY      INTERVENTIONAL RADIOLOGY PROCEDURE N/A 6/8/2024    Procedure: Intravascular Ultrasound;  Surgeon: Sher Solorzano MD;  Location: [facility] CATH INVASIVE LOCATION;  Service: Cardiovascular;  Laterality: N/A;     "TONSILLECTOMY         History reviewed. No pertinent past medical history.    Family History   Problem Relation Age of Onset    No Known Problems Mother     No Known Problems Father        Social History     Socioeconomic History    Marital status: Single   Tobacco Use    Smoking status: Former     Current packs/day: 0.00     Average packs/day: 0.5 packs/day for 2.0 years (1.0 ttl pk-yrs)     Types: Cigarettes     Start date: 1989     Quit date: 1991     Years since quittin.6    Smokeless tobacco: Former     Types: Chew     Quit date:    Vaping Use    Vaping status: Never Used   Substance and Sexual Activity    Alcohol use: Yes     Alcohol/week: 6.0 standard drinks of alcohol     Types: 6 Cans of beer per week    Drug use: Never    Sexual activity: Defer         Procedures      Objective:       Physical Exam    /73 (BP Location: Right arm, Patient Position: Sitting, Cuff Size: Adult)   Pulse 60   Ht 182.9 cm (72\")   Wt 90.3 kg (199 lb)   SpO2 98% Comment: RA  BMI 26.99 kg/m²   The patient is alert, oriented and in no distress.    Vital signs as noted above.    Head and neck revealed no carotid bruits or jugular venous distension.  No thyromegaly or lymphadenopathy is present.    Lungs clear.  No wheezing.  Breath sounds are normal bilaterally.    Heart normal first and second heart sounds.  No murmur..  No pericardial rub is present.  No gallop is present.    Abdomen soft and nontender.  No organomegaly is present.    Extremities revealed good peripheral pulses without any pedal edema.    Skin warm and dry.    Musculoskeletal system is grossly normal.    CNS grossly normal.    Reviewed and updated.        "

## 2024-07-03 ENCOUNTER — OFFICE VISIT (OUTPATIENT)
Dept: CARDIOLOGY | Facility: CLINIC | Age: 54
End: 2024-07-03
Payer: COMMERCIAL

## 2024-07-03 VITALS
BODY MASS INDEX: 26.95 KG/M2 | HEART RATE: 60 BPM | OXYGEN SATURATION: 98 % | HEIGHT: 72 IN | DIASTOLIC BLOOD PRESSURE: 73 MMHG | WEIGHT: 199 LBS | SYSTOLIC BLOOD PRESSURE: 107 MMHG

## 2024-07-03 DIAGNOSIS — I24.9 ACS (ACUTE CORONARY SYNDROME): ICD-10-CM

## 2024-07-03 DIAGNOSIS — F10.10 ALCOHOL ABUSE: ICD-10-CM

## 2024-07-03 DIAGNOSIS — I25.10 CORONARY ARTERY DISEASE INVOLVING NATIVE CORONARY ARTERY OF NATIVE HEART WITHOUT ANGINA PECTORIS: ICD-10-CM

## 2024-07-03 DIAGNOSIS — I21.09: Primary | ICD-10-CM

## 2024-07-03 DIAGNOSIS — I24.9 ACUTE CORONARY SYNDROME WITH HIGH TROPONIN: ICD-10-CM

## 2024-07-03 DIAGNOSIS — E78.00 HYPERCHOLESTEROLEMIA: ICD-10-CM

## 2024-07-14 ENCOUNTER — PATIENT MESSAGE (OUTPATIENT)
Dept: FAMILY MEDICINE CLINIC | Facility: CLINIC | Age: 54
End: 2024-07-14
Payer: COMMERCIAL

## 2024-07-16 ENCOUNTER — TELEPHONE (OUTPATIENT)
Dept: FAMILY MEDICINE CLINIC | Facility: CLINIC | Age: 54
End: 2024-07-16
Payer: COMMERCIAL

## 2024-07-16 RX ORDER — LISINOPRIL 5 MG/1
5 TABLET ORAL
Qty: 90 TABLET | Refills: 1 | Status: SHIPPED | OUTPATIENT
Start: 2024-07-16 | End: 2024-07-18

## 2024-07-17 NOTE — TELEPHONE ENCOUNTER
----- Message from [facility] [employer] sent at 7/17/2024 11:59 AM EDT -----  Regarding: Ed medication  Contact: [phone]  Lj Mulligan sent a request to your office for the Lisinopril  at my request

## 2024-07-18 ENCOUNTER — TELEPHONE (OUTPATIENT)
Dept: CARDIOLOGY | Facility: CLINIC | Age: 54
End: 2024-07-18
Payer: COMMERCIAL

## 2024-07-18 RX ORDER — LISINOPRIL 5 MG/1
5 TABLET ORAL DAILY
Qty: 30 TABLET | Refills: 0 | Status: SHIPPED | OUTPATIENT
Start: 2024-07-18

## 2024-07-18 NOTE — TELEPHONE ENCOUNTER
REQUEST FOR CARDIAC CLEARANCE    Caller name: Logan Huitron     Phone Number: [phone]    Surgeon's name: DR. WALKER    Type of planned surgery: TEETH CLEANING & CROWN     Date of planned surgery: 8/20/24    Type of anesthesia: NO    Have you been experiencing chest pain or shortness of breath? NO    Is your doctor requesting for you to stop any of your medications prior to your surgery? NO    Where should we fax the clearance to? DOESN'T HAVE FAX OR PHONE NUMBER

## 2024-07-18 NOTE — TELEPHONE ENCOUNTER
FACILITY: [facility]  PHONE: [phone]  FAX: [phone]  PROCEDURE: dental cleaning/crown  SCHEDULED: TBD  MEDS TO HOLD:   plavix and asa        Placed on Dr James's desk for review

## 2024-07-18 NOTE — TELEPHONE ENCOUNTER
Called patient gave our fax number and asked the patient could he call his dentist office and have them fax over a sx clearance. Patient verbalized understanding

## 2024-07-22 NOTE — TELEPHONE ENCOUNTER
PATIENT IS CLEARED FOR PROCEDURE AND CAN HOLD MEDICATION FOR 1 DAYS. LETTER HAS BEEN SIGNED AND FAXED BACK TO THE DOCTORS OFFICE.

## 2024-09-16 NOTE — PROGRESS NOTES
Encounter Date:10/07/2024  Last seen 7/3/2024      Patient ID: Logan Huitron is a 54 y.o. male    Chief complaint  Status post stent  Dyslipidemia     History of present illness  Since I have last seen, the patient has been without any chest discomfort ,shortness of breath, palpitations, dizziness or syncope.  Denies having any headache ,abdominal pain ,nausea, vomiting , diarrhea constipation, loss of weight or loss of appetite.  Denies having any excessive bruising ,hematuria or blood in the stool.    Review of all systems negative except as indicated.    Reviewed ROS.  Assessment and plan  ]]]]]]]]]]]]]]]]]]]]]]  Impression  ==============  - Status post stent to LAD 6/8/2024     -Unstable angina  Non-STEMI.-Prior to stent placement  Troponin 662 2156 2516     Cardiac catheterization 6/8/2024.  Left ventricular angiogram revealed left ventricular apical hypokinesis with ejection fraction of 50 to 55%.  1-2 placed mitral regurgitation is present.     Left main coronary artery is normal.  Left anterior descending artery has 99% disease with probable clot between the first and second diagonal branches.  Circumflex coronary artery is a large and codominant vessel and is normal.  Right coronary artery is a codominant vessel and is normal.      Echocardiogram 6/8/2024.  Structurally and functionally normal cardiac valves except for mild mitral regurgitation..  Left ventricle size is normal with apical and periapical severe hypokinesis with estimated left ventricular ejection fraction of 40-45 %.  Possible small apical thrombus.  Normal left ventricular diastolic function.  Left ventricular peak systolic longitudinal strain is abnormal with GL PS of -9%.     - Dyslipidemia  Cholesterol 235  VLDL 67  Start high-intensity statin.     - History of excessive alcohol consumption.  Patient apparently drinks 6 beers a day.     - History of hand surgery and tonsillectomy.     - Family history of coronary artery disease      - Former smoker.     - No known allergies     =================  Plan  =================  Status post stent to LAD 6/8/2024  Unstable angina and non-STEMI prior to stent placement.     Patient is not having any angina pectoris or congestive heart failure.     Left ventricular dysfunction  Hopefully left ventricle function would improve with interventional revascularization.  Repeat echocardiogram in 6 months.     Dyslipidemia-on statins.     Antiplatelet therapy was discussed.  Take Plavix without disruption for at least 1 year.  Brilinta is too expensive.      Medications were reviewed and updated.     Current medications include aspirin atorvastatin 80 mg p.o. nightly lisinopril 5 mg a day metoprolol XL 25 mg a day Brilinta 90 mg twice daily.     Follow-up in the office in 6 months with EKG an echocardiogram    Activities limitations and expectations were discussed with patient..     Further plan will depend on patient's progress.     Reviewed and updated 10/7/2024.  ]]]]]]]]]]]]]]]]]]]]]]]]]         Diagnosis Plan   1. Acute transmural myocardial infarct anterior wall, initial hospitaliz        2. Acute coronary syndrome with high troponin        3. Coronary artery disease involving native coronary artery of native heart without angina pectoris        4. Hypercholesterolemia        LAB RESULTS (LAST 7 DAYS)    CBC        BMP        CMP         BNP        TROPONIN        CoAg        Creatinine Clearance  CrCl cannot be calculated (Patient's most recent lab result is older than the maximum 30 days allowed.).    ABG        Radiology  No radiology results for the last day                The following portions of the patient's history were reviewed and updated as appropriate: allergies, current medications, past family history, past medical history, past social history, past surgical history, and problem list.    Review of Systems   Constitutional: Negative for malaise/fatigue.   Cardiovascular:  Negative for chest  pain, dyspnea on exertion, leg swelling and palpitations.   Respiratory:  Negative for cough and shortness of breath.    Gastrointestinal:  Negative for abdominal pain, nausea and vomiting.   Neurological:  Negative for dizziness, focal weakness, headaches, light-headedness and numbness.   All other systems reviewed and are negative.      Current Outpatient Medications:     acetaminophen (TYLENOL) 325 MG tablet, Take 2 tablets by mouth Every 6 (Six) Hours As Needed for Mild Pain., Disp: , Rfl:     aspirin 81 MG EC tablet, Take 1 tablet by mouth Daily., Disp: 30 tablet, Rfl: 0    atorvastatin (LIPITOR) 80 MG tablet, Take 1 tablet by mouth Every Night., Disp: 90 tablet, Rfl: 3    clopidogrel (PLAVIX) 75 MG tablet, Take 1 tablet by mouth Daily., Disp: 90 tablet, Rfl: 3    lisinopril (PRINIVIL,ZESTRIL) 5 MG tablet, TAKE 1 TABLET BY MOUTH EVERY DAY, Disp: 30 tablet, Rfl: 0    metoprolol succinate XL (TOPROL-XL) 25 MG 24 hr tablet, Take 1 tablet by mouth Daily., Disp: 30 tablet, Rfl: 3    nitroglycerin (NITROSTAT) 0.4 MG SL tablet, Place 1 tablet under the tongue Every 5 (Five) Minutes As Needed for Chest Pain (Systolic BP Greater Than 100). Take no more than 3 doses in 15 minutes., Disp: 30 tablet, Rfl: 12    No Known Allergies    Family History   Problem Relation Age of Onset    No Known Problems Mother     No Known Problems Father        Past Surgical History:   Procedure Laterality Date    CARDIAC CATHETERIZATION N/A 6/8/2024    Procedure: Left Heart Cath and coronary angiogram;  Surgeon: Pierre James MD;  Location: [facility] CATH INVASIVE LOCATION;  Service: Cardiovascular;  Laterality: N/A;    CARDIAC CATHETERIZATION N/A 6/8/2024    Procedure: Percutaneous Coronary Intervention;  Surgeon: Sher Solorzano MD;  Location: [facility] CATH INVASIVE LOCATION;  Service: Cardiovascular;  Laterality: N/A;    HAND SURGERY      INTERVENTIONAL RADIOLOGY PROCEDURE N/A 6/8/2024    Procedure: Intravascular Ultrasound;  Surgeon: Rashad  MD Sher;  Location: [facility] INVASIVE LOC[facility];  Service: Cardiovascular;  Laterality: N/A;    TONSILLECTOMY         No past medical history on file.    Family History   Problem Relation Age of Onset    No Known Problems Mother     No Known Problems Father        Social History     Socioeconomic History    Marital status: Single   Tobacco Use    Smoking status: Former     Current packs/day: 0.00     Average packs/day: 0.5 packs/day for 2.0 years (1.0 ttl pk-yrs)     Types: Cigarettes     Start date: 1989     Quit date: 1991     Years since quittin.8    Smokeless tobacco: Former     Types: Chew     Quit date:    Vaping Use    Vaping status: Never Used   Substance and Sexual Activity    Alcohol use: Yes     Alcohol/week: 6.0 standard drinks of alcohol     Types: 6 Cans of beer per week    Drug use: Never    Sexual activity: Defer         Procedures      Objective:       Physical Exam    There were no vitals taken for this visit.  The patient is alert, oriented and in no distress.    Vital signs as noted above.    Head and neck revealed no carotid bruits or jugular venous distension.  No thyromegaly or lymphadenopathy is present.    Lungs clear.  No wheezing.  Breath sounds are normal bilaterally.    Heart normal first and second heart sounds.  No murmur..  No pericardial rub is present.  No gallop is present.    Abdomen soft and nontender.  No organomegaly is present.    Extremities revealed good peripheral pulses without any pedal edema.    Skin warm and dry.    Musculoskeletal system is grossly normal.    CNS grossly normal.    Reviewed and updated.

## 2024-09-29 ENCOUNTER — PATIENT MESSAGE (OUTPATIENT)
Dept: FAMILY MEDICINE CLINIC | Facility: CLINIC | Age: 54
End: 2024-09-29
Payer: COMMERCIAL

## 2024-10-01 RX ORDER — METOPROLOL SUCCINATE 25 MG/1
25 TABLET, EXTENDED RELEASE ORAL
Qty: 90 TABLET | Refills: 3 | Status: SHIPPED | OUTPATIENT
Start: 2024-10-01 | End: 2024-10-07

## 2024-10-07 ENCOUNTER — OFFICE VISIT (OUTPATIENT)
Dept: CARDIOLOGY | Facility: CLINIC | Age: 54
End: 2024-10-07
Payer: COMMERCIAL

## 2024-10-07 VITALS
DIASTOLIC BLOOD PRESSURE: 80 MMHG | WEIGHT: 205 LBS | BODY MASS INDEX: 27.77 KG/M2 | SYSTOLIC BLOOD PRESSURE: 119 MMHG | HEIGHT: 72 IN | HEART RATE: 68 BPM | OXYGEN SATURATION: 97 %

## 2024-10-07 DIAGNOSIS — I24.9 ACUTE CORONARY SYNDROME WITH HIGH TROPONIN: ICD-10-CM

## 2024-10-07 DIAGNOSIS — I21.09: Primary | ICD-10-CM

## 2024-10-07 DIAGNOSIS — I25.10 CORONARY ARTERY DISEASE INVOLVING NATIVE CORONARY ARTERY OF NATIVE HEART WITHOUT ANGINA PECTORIS: ICD-10-CM

## 2024-10-07 DIAGNOSIS — E78.00 HYPERCHOLESTEROLEMIA: ICD-10-CM

## 2024-10-07 PROCEDURE — 99214 OFFICE O/P EST MOD 30 MIN: CPT | Performed by: INTERNAL MEDICINE

## 2024-10-07 RX ORDER — METOPROLOL SUCCINATE 25 MG/1
25 TABLET, EXTENDED RELEASE ORAL DAILY
Qty: 30 TABLET | Refills: 0 | Status: SHIPPED | OUTPATIENT
Start: 2024-10-07

## 2024-11-04 RX ORDER — LISINOPRIL 5 MG/1
5 TABLET ORAL DAILY
Qty: 30 TABLET | Refills: 0 | Status: SHIPPED | OUTPATIENT
Start: 2024-11-04

## 2024-11-15 ENCOUNTER — OFFICE VISIT (OUTPATIENT)
Dept: FAMILY MEDICINE CLINIC | Facility: CLINIC | Age: 54
End: 2024-11-15
Payer: COMMERCIAL

## 2024-11-15 VITALS
RESPIRATION RATE: 18 BRPM | SYSTOLIC BLOOD PRESSURE: 120 MMHG | WEIGHT: 206.4 LBS | OXYGEN SATURATION: 97 % | BODY MASS INDEX: 27.96 KG/M2 | HEIGHT: 72 IN | HEART RATE: 71 BPM | DIASTOLIC BLOOD PRESSURE: 78 MMHG

## 2024-11-15 DIAGNOSIS — R42 DIZZINESS: Primary | ICD-10-CM

## 2024-11-15 PROBLEM — I24.9 ACS (ACUTE CORONARY SYNDROME): Status: RESOLVED | Noted: 2024-06-07 | Resolved: 2024-11-15

## 2024-11-15 PROBLEM — I24.9 ACUTE CORONARY SYNDROME WITH HIGH TROPONIN: Status: RESOLVED | Noted: 2024-06-07 | Resolved: 2024-11-15

## 2024-11-15 PROCEDURE — 99213 OFFICE O/P EST LOW 20 MIN: CPT | Performed by: INTERNAL MEDICINE

## 2024-11-15 NOTE — PROGRESS NOTES
Chief Complaint  Dizziness    HPI:    Logan Huitron presents to [facility]    Patient is a 54-year-old male with history of hyperlipidemia, coronary artery disease status post drug-eluting stent to LAD, alcohol use presenting for evaluation of dizziness.     Patient reports that he had sudden onset of room spinning dizziness that occurred two days ago when he got out of bed. Symptoms improved later in the day and was still present yesterday and today. Room spinning worse with positional changes including rolling over in bed and improves with time. Denies neurological symptoms including facial droop, slurred speech, focal sensory/motor deficit.  Denies headache, blurred vision, double vision, tinnitus, photophobia, phonophobia. Denies chest pain, shortness of breath, orthopnea, PND, lower extremity edema, palpitations, tachycardia, or syncope. Blood pressure overall good. Denies recent illness. Denies runny nose, sore throat, cough, congestion, ear pain/pressure, sinus pain/pressure. Mood overall has been good. Denies new medications or changes in medications. Denies recent significant blood loss, bruising. Staying well-hydrated. Previously had episode of similar episode about 20-30 years ago and has not had since.     Patient recently saw cardiology on 10/7/2024.  Overall had been doing well without cardiopulmonary symptoms after prior LAD stent placement.  Patient recommended to continue aspirin 81 mg daily, atorvastatin 80 mg daily, lisinopril 5 mg daily, metoprolol XL 25 mg daily.  Patient also recommended to continue Plavix for 1 year without disruption.  Follow-up recommended in 6 months with EKG and echocardiogram.      Review of Systems:  ROS negative unless otherwise noted in HPI above.    History reviewed. No pertinent past medical history.      Current Outpatient Medications:     acetaminophen (TYLENOL) 325 MG tablet, Take 2 tablets by mouth Every 6 (Six) Hours As Needed  "for Mild Pain., Disp: , Rfl:     aspirin 81 MG EC tablet, Take 1 tablet by mouth Daily., Disp: 30 tablet, Rfl: 0    atorvastatin (LIPITOR) 80 MG tablet, Take 1 tablet by mouth Every Night., Disp: 90 tablet, Rfl: 3    clopidogrel (PLAVIX) 75 MG tablet, Take 1 tablet by mouth Daily., Disp: 90 tablet, Rfl: 3    lisinopril (PRINIVIL,ZESTRIL) 5 MG tablet, Take 1 tablet by mouth Daily., Disp: 30 tablet, Rfl: 0    metoprolol succinate XL (TOPROL-XL) 25 MG 24 hr tablet, TAKE 1 TABLET BY MOUTH EVERY DAY, Disp: 30 tablet, Rfl: 0    nitroglycerin (NITROSTAT) 0.4 MG SL tablet, Place 1 tablet under the tongue Every 5 (Five) Minutes As Needed for Chest Pain (Systolic BP Greater Than 100). Take no more than 3 doses in 15 minutes., Disp: 30 tablet, Rfl: 12    Social History     Socioeconomic History    Marital status: Single   Tobacco Use    Smoking status: Former     Current packs/day: 0.00     Average packs/day: 0.5 packs/day for 2.0 years (1.0 ttl pk-yrs)     Types: Cigarettes     Start date: 1989     Quit date: 1991     Years since quittin.0     Passive exposure: Past    Smokeless tobacco: Former     Types: Chew     Quit date:    Vaping Use    Vaping status: Never Used   Substance and Sexual Activity    Alcohol use: Yes     Alcohol/week: 6.0 standard drinks of alcohol     Types: 6 Cans of beer per week    Drug use: Never    Sexual activity: Yes     Partners: Female     Birth control/protection: Vasectomy        Objective   Vital Signs:  Resp 18   Ht 182.9 cm (72\")   Wt 93.6 kg (206 lb 6.4 oz)   BMI 27.99 kg/m²   Estimated body mass index is 27.99 kg/m² as calculated from the following:    Height as of this encounter: 182.9 cm (72\").    Weight as of this encounter: 93.6 kg (206 lb 6.4 oz).    Physical Exam:  General: Well-appearing patient, no apparent distress  HEENT: No posterior pharynx erythema, no tonsillar erythema or exudates, normal external auditory canals, TM normal without bulging or erythema, " middle ear effusion of the left ear significantly greater than right, pupils equal round reactive to light and accommodation, no nystagmus, extraocular movements intact  Cardiac: Regular rate and rhythm, normal S1/S2, no murmur, rubs or gallops, no lower extremity edema  Lungs: Clear to auscultation bilaterally, no crackles or wheezes  Skin: No significant rashes or lesions  MSK: Grossly normal tone and strength, 5 out of 5 strength in upper and lower extremities bilaterally  Neuro: Alert and oriented x3, CN II-XII grossly intact, normal sensation in upper and lower extremities bilaterally  Psych: Appropriate mood and affect    Assessment and Plan:    (R42) Dizziness  Assessment: Patient with recent room spinning dizziness that has overall improved over the last two days.  No red flag symptoms.  Physical exam, including neuroexam, unremarkable.  Symptoms consistent with BPPV with left middle ear effusion possibly contributing as well.  After discussion, patient prefers to treat conservatively due to improvement in symptoms.  Holding off on meclizine as patient prefers to minimize medications.  Patient aware of signs and symptoms which should prompt reevaluation.  Plan:  - Cautious with positional changes  - Hold on labs and imaging for now  - Hold on meclizine and PT for Epley for now  - Follow up if new/worsening symptoms  - Flonase daily  - Follow up as scheduled    Patient was given instructions and counseling regarding his condition or for health maintenance advice. Please see specific information pulled into the AVS if appropriate.       Dr Mikey Keller   Internal Medicine Physician  [facility]  [address], [address]  [place] [address]

## 2024-11-15 NOTE — PATIENT INSTRUCTIONS
Vertigo  - Cautious with positional changes  - Hold on labs and imaging for now  - Hold on meclizine and PT for Epley for now  - Follow up if new/worsening symptoms    Flonase daily    Follow up as scheduled

## 2024-12-02 RX ORDER — LISINOPRIL 5 MG/1
5 TABLET ORAL DAILY
Qty: 30 TABLET | Refills: 0 | Status: SHIPPED | OUTPATIENT
Start: 2024-12-02

## 2024-12-18 ENCOUNTER — LAB (OUTPATIENT)
Dept: FAMILY MEDICINE CLINIC | Facility: CLINIC | Age: 54
End: 2024-12-18
Payer: COMMERCIAL

## 2024-12-18 ENCOUNTER — OFFICE VISIT (OUTPATIENT)
Dept: FAMILY MEDICINE CLINIC | Facility: CLINIC | Age: 54
End: 2024-12-18
Payer: COMMERCIAL

## 2024-12-18 VITALS
HEIGHT: 72 IN | OXYGEN SATURATION: 98 % | SYSTOLIC BLOOD PRESSURE: 122 MMHG | WEIGHT: 208.6 LBS | RESPIRATION RATE: 18 BRPM | HEART RATE: 75 BPM | DIASTOLIC BLOOD PRESSURE: 76 MMHG | BODY MASS INDEX: 28.25 KG/M2

## 2024-12-18 DIAGNOSIS — I25.10 CORONARY ARTERY DISEASE INVOLVING NATIVE CORONARY ARTERY OF NATIVE HEART WITHOUT ANGINA PECTORIS: ICD-10-CM

## 2024-12-18 DIAGNOSIS — I50.20 HEART FAILURE WITH REDUCED EJECTION FRACTION: ICD-10-CM

## 2024-12-18 DIAGNOSIS — R42 VERTIGO: ICD-10-CM

## 2024-12-18 DIAGNOSIS — Z11.59 ENCOUNTER FOR HEPATITIS C SCREENING TEST FOR LOW RISK PATIENT: ICD-10-CM

## 2024-12-18 DIAGNOSIS — Z12.5 PROSTATE CANCER SCREENING: ICD-10-CM

## 2024-12-18 DIAGNOSIS — E78.00 HYPERCHOLESTEROLEMIA: ICD-10-CM

## 2024-12-18 DIAGNOSIS — Z00.00 PREVENTATIVE HEALTH CARE: Primary | ICD-10-CM

## 2024-12-18 LAB
CHOLEST SERPL-MCNC: 188 MG/DL (ref 0–200)
HCV AB SER QL: NORMAL
HDLC SERPL-MCNC: 42 MG/DL (ref 40–60)
LDLC SERPL CALC-MCNC: 98 MG/DL (ref 0–100)
LDLC/HDLC SERPL: 2.12 {RATIO}
PSA SERPL-MCNC: 0.87 NG/ML (ref 0–4)
TRIGL SERPL-MCNC: 285 MG/DL (ref 0–150)
VLDLC SERPL-MCNC: 48 MG/DL (ref 5–40)

## 2024-12-18 PROCEDURE — G0103 PSA SCREENING: HCPCS | Performed by: INTERNAL MEDICINE

## 2024-12-18 PROCEDURE — 99214 OFFICE O/P EST MOD 30 MIN: CPT | Performed by: INTERNAL MEDICINE

## 2024-12-18 PROCEDURE — 36415 COLL VENOUS BLD VENIPUNCTURE: CPT

## 2024-12-18 PROCEDURE — 86803 HEPATITIS C AB TEST: CPT | Performed by: INTERNAL MEDICINE

## 2024-12-18 PROCEDURE — 80061 LIPID PANEL: CPT | Performed by: INTERNAL MEDICINE

## 2024-12-18 PROCEDURE — 99396 PREV VISIT EST AGE 40-64: CPT | Performed by: INTERNAL MEDICINE

## 2024-12-18 NOTE — PATIENT INSTRUCTIONS
Please stop at lab on second floor to have blood drawn    Consider Shingles vaccine at local pharmacy    Medications:  Continue medications as prescribed    Follow up with cardiology as scheduled    Encourage healthy diet and exercise    Follow up annually or sooner if something arises

## 2024-12-18 NOTE — PROGRESS NOTES
Chief Complaint  Annual Exam    HPI:    Logan Huitron presents to [facility]    Patient presenting for annual preventative care visit.     Coronary artery disease  Patient with a history of NSTEMI status post PCI with LC to LAD on 6/8/2024.  Patient currently on aspirin 81 mg daily, atorvastatin 80 mg daily, lisinopril 5 mg daily, metoprolol XL 25 mg daily, Plavix 75 mg nightly, and nitroglycerin as needed.  Patient overall doing well status post stent placement. Denies chest pain, shortness of breath, orthopnea, PND, lower extremity edema, palpitations, tachycardia     Systolic heart failure  EF noted to be 40 to 45% on echocardiogram from 6/8/2024.  Follows with cardiology.  Due for repeat echocardiogram approximately April 2025.  Weight overall stable.  Denies symptoms of volume overload.    Blood pressure overall doing well. No longer checking because was consistently about 120/70's.     Hyperlipidemia  Compliant with statin. No side effects on medication.  Has to be active and frequently plays golf.    Preventative:    Diet and Exercise: Trying to be active and frequently plays golf    Alcohol, Tobacco, and Recreational Drug use: Occasional alcohol use    Cancer screenings:  PSA: Most recent PSA 0.89 on 12/12/2023  Colonoscopy:  Colonoscopy completed 2/29/2024 for colonoscopy notable for multiple colon polyps, internal hemorrhoids.  Repeat colonoscopy recommended in 3 years (2027)    Immunizations: Due for shingles    Advanced Health Care Directive: Not on file        Review of Systems:  ROS negative unless otherwise noted in HPI above.    History reviewed. No pertinent past medical history.      Current Outpatient Medications:     acetaminophen (TYLENOL) 325 MG tablet, Take 2 tablets by mouth Every 6 (Six) Hours As Needed for Mild Pain., Disp: , Rfl:     aspirin 81 MG EC tablet, Take 1 tablet by mouth Daily., Disp: 30 tablet, Rfl: 0    atorvastatin (LIPITOR) 80 MG tablet,  "Take 1 tablet by mouth Every Night., Disp: 90 tablet, Rfl: 3    clopidogrel (PLAVIX) 75 MG tablet, Take 1 tablet by mouth Daily., Disp: 90 tablet, Rfl: 3    lisinopril (PRINIVIL,ZESTRIL) 5 MG tablet, Take 1 tablet by mouth Daily., Disp: 30 tablet, Rfl: 0    metoprolol succinate XL (TOPROL-XL) 25 MG 24 hr tablet, TAKE 1 TABLET BY MOUTH EVERY DAY, Disp: 30 tablet, Rfl: 0    nitroglycerin (NITROSTAT) 0.4 MG SL tablet, Place 1 tablet under the tongue Every 5 (Five) Minutes As Needed for Chest Pain (Systolic BP Greater Than 100). Take no more than 3 doses in 15 minutes., Disp: 30 tablet, Rfl: 12    Social History     Socioeconomic History    Marital status: Single   Tobacco Use    Smoking status: Former     Current packs/day: 0.00     Average packs/day: 0.5 packs/day for 2.0 years (1.0 ttl pk-yrs)     Types: Cigarettes     Start date: 1989     Quit date: 1991     Years since quittin.1     Passive exposure: Past    Smokeless tobacco: Former     Types: Chew     Quit date:    Vaping Use    Vaping status: Never Used   Substance and Sexual Activity    Alcohol use: Yes     Alcohol/week: 6.0 standard drinks of alcohol     Types: 6 Cans of beer per week    Drug use: Never    Sexual activity: Yes     Partners: Female     Birth control/protection: Vasectomy        Objective   Vital Signs:  /76   Pulse 75   Resp 18   Ht 182.9 cm (72\")   Wt 94.6 kg (208 lb 9.6 oz)   SpO2 98%   BMI 28.29 kg/m²   Estimated body mass index is 28.29 kg/m² as calculated from the following:    Height as of this encounter: 182.9 cm (72\").    Weight as of this encounter: 94.6 kg (208 lb 9.6 oz).    Physical Exam:  General: Well-appearing patient, no apparent distress  HEENT: No posterior pharynx erythema, no tonsillar erythema or exudates, normal external auditory canals, TM normal without bulging or erythema  Cardiac: Regular rate and rhythm, normal S1/S2, no murmur, rubs or gallops, no lower extremity edema  Lungs: Clear to " auscultation bilaterally, no crackles or wheezes  Abdomen: Soft, non-tender, no guarding or rebound tenderness, no hepatosplenomegaly  Skin: No significant rashes or lesions  MSK: Grossly normal tone and strength  Neuro: Alert and oriented x3, CN II-XII grossly intact  Psych: Appropriate mood and affect    Assessment and Plan:    (Z00.00) Preventative health care  Patient is a 54 year male old who is overall doing well. Reviewed social and family history. Encouraged increased healthy diet and exercise and discussed importance to overall health.  Updating age and gender appropriate cancer screenings with PSA.  Discussed indicated vaccines based on age and comorbidities. No skin, mood concerns.  Plan:  - Encourage healthy diet and exercise  - Up date vaccines, if necessary  - Screening labs as ordered  - Update cancer screening as below  - Encouraged future advanced health care directive    (I25.10) Coronary artery disease involving native coronary artery of native heart without angina pectoris  Assessment: Follows with cardiology.  Overall doing well status post LAD stent.  Currently asymptomatic and compliant with medications.    Plan:  - Continue aspirin and Plavix as prescribed  - Continue atorvastatin, metoprolol, and lisinopril as prescribed  - Encourage healthy diet and exercise     (E78.00) Hypercholesterolemia   Assessment: Stable on statin without side effects.  Due for repeat lipid panel after recently starting statin.  Discussed importance of healthy diet and exercise.  Plan:  - Fasting lipid panel  - Continue statin without changes  - Discussed healthy diet and lifestyle     (I50.20) Heart failure with reduced ejection fraction  Assessment: Most recent EF 40 to 45%.  Most likely secondary to recent coronary artery disease status post appointment.  Appears euvolemic on exam.  Weight overall stable.  No evidence of acute exacerbation.  Plan:  - Continue medications as prescribed  - Follow-up with cardiology  as scheduled  - Echocardiogram per cardiology    (R42) Vertigo  Assessment: Previously diagnosed with BPPV and has since had complete resolution of symptoms.  Currently asymptomatic.  Plan:  - Monitor    (Z12.5) Prostate cancer screening - Plan: PSA SCREENING    (Z11.59) Encounter for hepatitis C screening test for low risk patient - Plan: Hepatitis C antibody    BMI is >= 25 and <30. (Overweight) The following options were offered after discussion;: Encouraged healthy diet and exercise      Patient was given instructions and counseling regarding his condition or for health maintenance advice. Please see specific information pulled into the AVS if appropriate.       Dr Mikey Keller   Internal Medicine Physician  [facility]  [address], [address]  [place] [address]

## 2024-12-23 ENCOUNTER — PATIENT ROUNDING (BHMG ONLY) (OUTPATIENT)
Dept: FAMILY MEDICINE CLINIC | Facility: CLINIC | Age: 54
End: 2024-12-23
Payer: COMMERCIAL

## 2024-12-23 NOTE — PROGRESS NOTES
December 23, 2024      A Mant[employer] message was sent to Logan Huitron inquiring about patient's experience at our office during a recent visit.      RIK [place]  [facility]  [address]  [place] [id number].

## 2025-01-06 RX ORDER — METOPROLOL SUCCINATE 25 MG/1
25 TABLET, EXTENDED RELEASE ORAL DAILY
Qty: 30 TABLET | Refills: 0 | Status: SHIPPED | OUTPATIENT
Start: 2025-01-06

## 2025-01-06 RX ORDER — LISINOPRIL 5 MG/1
5 TABLET ORAL DAILY
Qty: 30 TABLET | Refills: 0 | Status: SHIPPED | OUTPATIENT
Start: 2025-01-06

## 2025-02-03 RX ORDER — LISINOPRIL 5 MG/1
5 TABLET ORAL DAILY
Qty: 90 TABLET | Refills: 1 | Status: SHIPPED | OUTPATIENT
Start: 2025-02-03

## 2025-03-04 RX ORDER — METOPROLOL SUCCINATE 25 MG/1
25 TABLET, EXTENDED RELEASE ORAL DAILY
Qty: 90 TABLET | Refills: 0 | Status: SHIPPED | OUTPATIENT
Start: 2025-03-04

## 2025-05-12 RX ORDER — METOPROLOL SUCCINATE 25 MG/1
25 TABLET, EXTENDED RELEASE ORAL DAILY
Qty: 90 TABLET | Refills: 0 | Status: SHIPPED | OUTPATIENT
Start: 2025-05-12

## 2025-05-12 RX ORDER — CLOPIDOGREL BISULFATE 75 MG/1
75 TABLET ORAL DAILY
Qty: 90 TABLET | Refills: 0 | Status: SHIPPED | OUTPATIENT
Start: 2025-05-12

## 2025-05-12 RX ORDER — LISINOPRIL 5 MG/1
5 TABLET ORAL DAILY
Qty: 90 TABLET | Refills: 1 | Status: SHIPPED | OUTPATIENT
Start: 2025-05-12

## 2025-05-12 NOTE — TELEPHONE ENCOUNTER
Rx Refill Note  Requested Prescriptions     Pending Prescriptions Disp Refills    clopidogrel (PLAVIX) 75 MG tablet 90 tablet 0     Sig: Take 1 tablet by mouth Daily.      Last office visit with prescribing clinician: 10/7/2024   Last telemedicine visit with prescribing clinician: Visit date not found   Next office visit with prescribing clinician: Visit date not found                         Would you like a call back once the refill request has been completed: [] Yes [] No    If the office needs to give you a call back, can they leave a voicemail: [] Yes [] No    Neema Moore MA  05/12/25, 08:36 EDT

## 2025-07-01 NOTE — TELEPHONE ENCOUNTER
Caller: Logan Huitron    Relationship: Self    Best call back number: [phone]    Requested Prescriptions:   Requested Prescriptions     Pending Prescriptions Disp Refills    atorvastatin (LIPITOR) 80 MG tablet 90 tablet 3     Sig: Take 1 tablet by mouth Every Night.        Pharmacy where request should be sent: [id number] [address] [facility] [phone] [facility] [phone]      Last office visit with prescribing clinician: 2024   Last telemedicine visit with prescribing clinician: Visit date not found   Next office visit with prescribing clinician: 2025     Additional details provided by patient: NEEDS FILLED,  PRESCRIPTION    Does the patient have less than a 3 day supply:  [] Yes  [x] No    Would you like a call back once the refill request has been completed: [] Yes [x] No    If the office needs to give you a call back, can they leave a voicemail: [] Yes [x] No    Pia Del Angel   25 15:28 EDT         DELETE AFTER READING TO PATIENT: “Thank you for sharing this information with me. I will send a message to the clinical team. Please allow 48 hours for the clinical staff to follow up on this request.”

## 2025-07-02 RX ORDER — ATORVASTATIN CALCIUM 80 MG/1
80 TABLET, FILM COATED ORAL NIGHTLY
Qty: 90 TABLET | Refills: 1 | OUTPATIENT
Start: 2025-07-02

## 2025-07-07 ENCOUNTER — PATIENT MESSAGE (OUTPATIENT)
Dept: FAMILY MEDICINE CLINIC | Facility: CLINIC | Age: 55
End: 2025-07-07
Payer: COMMERCIAL

## 2025-07-07 RX ORDER — ATORVASTATIN CALCIUM 80 MG/1
80 TABLET, FILM COATED ORAL NIGHTLY
Qty: 90 TABLET | Refills: 3 | Status: SHIPPED | OUTPATIENT
Start: 2025-07-07

## 2025-08-06 RX ORDER — CLOPIDOGREL BISULFATE 75 MG/1
75 TABLET ORAL DAILY
Qty: 30 TABLET | Refills: 1 | Status: SHIPPED | OUTPATIENT
Start: 2025-08-06

## 2025-08-14 RX ORDER — METOPROLOL SUCCINATE 25 MG/1
25 TABLET, EXTENDED RELEASE ORAL DAILY
Qty: 90 TABLET | Refills: 0 | Status: SHIPPED | OUTPATIENT
Start: 2025-08-14

## (undated) DEVICE — TBG NAMIC PRESS MONTR A/ F/M 12IN

## (undated) DEVICE — DGW .035 MC J3MM 150CM T H AMP: Brand: EMERALD

## (undated) DEVICE — CONTRST ISOVUE300 61PCT 50ML

## (undated) DEVICE — ELECTRD DEFIB M/FUNC PROPADZ RADIOL 2PK

## (undated) DEVICE — STPCK 3WY HP ROT

## (undated) DEVICE — PK TRY HEART CATH 50

## (undated) DEVICE — CATH DIAG IMPULSE PIG 5F 100CM

## (undated) DEVICE — GW RUNTHROUGH NS HYPERCOAT .014 3X180CM

## (undated) DEVICE — 6F .070 XB LAD 3.5 100CM: Brand: VISTA BRITE TIP

## (undated) DEVICE — PINNACLE INTRODUCER SHEATH: Brand: PINNACLE

## (undated) DEVICE — ANGIO-SEAL VIP VASCULAR CLOSURE DEVICE: Brand: ANGIO-SEAL

## (undated) DEVICE — Device

## (undated) DEVICE — CATH DIAG IMPULSE FL4 5F 100CM

## (undated) DEVICE — DEV INFL COMPAK W/ACCESSPLUS IN4530

## (undated) DEVICE — TREK CORONARY DILATATION CATHETER 2.50 MM X 12 MM / RAPID-EXCHANGE: Brand: TREK

## (undated) DEVICE — CATH IMG IVUS EAGLE EYE PLATIN RX DIGITAL .014IN 5FR

## (undated) DEVICE — NC TREK NEO™ CORONARY DILATATION CATHETER 3.50 MM X 15 MM / RAPID-EXCHANGE: Brand: NC TREK NEO™

## (undated) DEVICE — CATH DIAG IMPULSE FR4 5F 100CM